# Patient Record
Sex: MALE | Race: OTHER | HISPANIC OR LATINO | Employment: OTHER | URBAN - METROPOLITAN AREA
[De-identification: names, ages, dates, MRNs, and addresses within clinical notes are randomized per-mention and may not be internally consistent; named-entity substitution may affect disease eponyms.]

---

## 2020-03-19 ENCOUNTER — HOSPITAL ENCOUNTER (INPATIENT)
Facility: HOSPITAL | Age: 65
LOS: 4 days | Discharge: HOME | DRG: 195 | End: 2020-03-23
Attending: EMERGENCY MEDICINE | Admitting: HOSPITALIST

## 2020-03-19 DIAGNOSIS — R50.9 FEVER, UNSPECIFIED FEVER CAUSE: Primary | ICD-10-CM

## 2020-03-19 DIAGNOSIS — R05.9 COUGH: ICD-10-CM

## 2020-03-19 DIAGNOSIS — Z20.822 SUSPECTED COVID-19 VIRUS INFECTION: ICD-10-CM

## 2020-03-19 DIAGNOSIS — Z03.89 RULED OUT FOR MYOCARDIAL INFARCTION: ICD-10-CM

## 2020-03-19 DIAGNOSIS — R07.9 CHEST PAIN: ICD-10-CM

## 2020-03-19 PROBLEM — E11.9 DMII (DIABETES MELLITUS, TYPE 2): Status: ACTIVE | Noted: 2020-03-19

## 2020-03-19 PROBLEM — I10 HTN (HYPERTENSION): Status: ACTIVE | Noted: 2020-03-19

## 2020-03-19 LAB
ALBUMIN SERPL BCP-MCNC: 3.7 G/DL (ref 3.5–5.2)
ALP SERPL-CCNC: 134 U/L (ref 55–135)
ALT SERPL W/O P-5'-P-CCNC: 12 U/L (ref 10–44)
ANION GAP SERPL CALC-SCNC: 11 MMOL/L (ref 8–16)
AST SERPL-CCNC: 12 U/L (ref 10–40)
BASOPHILS # BLD AUTO: 0.06 K/UL (ref 0–0.2)
BASOPHILS NFR BLD: 0.3 % (ref 0–1.9)
BILIRUB SERPL-MCNC: 0.3 MG/DL (ref 0.1–1)
BILIRUB UR QL STRIP: NEGATIVE
BNP SERPL-MCNC: 169 PG/ML (ref 0–99)
BUN SERPL-MCNC: 32 MG/DL (ref 8–23)
CALCIUM SERPL-MCNC: 9.6 MG/DL (ref 8.7–10.5)
CHLORIDE SERPL-SCNC: 105 MMOL/L (ref 95–110)
CLARITY UR: CLEAR
CO2 SERPL-SCNC: 21 MMOL/L (ref 23–29)
COLOR UR: YELLOW
CREAT SERPL-MCNC: 2.1 MG/DL (ref 0.5–1.4)
CRP SERPL-MCNC: 154.9 MG/L (ref 0–8.2)
DIFFERENTIAL METHOD: ABNORMAL
EOSINOPHIL # BLD AUTO: 0.1 K/UL (ref 0–0.5)
EOSINOPHIL NFR BLD: 0.6 % (ref 0–8)
ERYTHROCYTE [DISTWIDTH] IN BLOOD BY AUTOMATED COUNT: 13.2 % (ref 11.5–14.5)
EST. GFR  (AFRICAN AMERICAN): 37 ML/MIN/1.73 M^2
EST. GFR  (NON AFRICAN AMERICAN): 32 ML/MIN/1.73 M^2
GLUCOSE SERPL-MCNC: 207 MG/DL (ref 70–110)
GLUCOSE UR QL STRIP: NEGATIVE
HCT VFR BLD AUTO: 29.3 % (ref 40–54)
HGB BLD-MCNC: 10.5 G/DL (ref 14–18)
HGB UR QL STRIP: NEGATIVE
IMM GRANULOCYTES # BLD AUTO: 0.11 K/UL (ref 0–0.04)
IMM GRANULOCYTES NFR BLD AUTO: 0.5 % (ref 0–0.5)
INFLUENZA A, MOLECULAR: NEGATIVE
INFLUENZA B, MOLECULAR: NEGATIVE
KETONES UR QL STRIP: NEGATIVE
LACTATE SERPL-SCNC: 1.7 MMOL/L (ref 0.5–2.2)
LEUKOCYTE ESTERASE UR QL STRIP: NEGATIVE
LYMPHOCYTES # BLD AUTO: 1.1 K/UL (ref 1–4.8)
LYMPHOCYTES NFR BLD: 5 % (ref 18–48)
MCH RBC QN AUTO: 31.7 PG (ref 27–31)
MCHC RBC AUTO-ENTMCNC: 35.8 G/DL (ref 32–36)
MCV RBC AUTO: 89 FL (ref 82–98)
MONOCYTES # BLD AUTO: 1.4 K/UL (ref 0.3–1)
MONOCYTES NFR BLD: 6.4 % (ref 4–15)
NEUTROPHILS # BLD AUTO: 18.8 K/UL (ref 1.8–7.7)
NEUTROPHILS NFR BLD: 87.2 % (ref 38–73)
NITRITE UR QL STRIP: NEGATIVE
NRBC BLD-RTO: 0 /100 WBC
PH UR STRIP: 6 [PH] (ref 5–8)
PLATELET # BLD AUTO: 319 K/UL (ref 150–350)
PMV BLD AUTO: 10 FL (ref 9.2–12.9)
POCT GLUCOSE: 137 MG/DL (ref 70–110)
POCT GLUCOSE: 193 MG/DL (ref 70–110)
POTASSIUM SERPL-SCNC: 4.8 MMOL/L (ref 3.5–5.1)
PROCALCITONIN SERPL IA-MCNC: 25.7 NG/ML
PROT SERPL-MCNC: 8.6 G/DL (ref 6–8.4)
PROT UR QL STRIP: ABNORMAL
RBC # BLD AUTO: 3.31 M/UL (ref 4.6–6.2)
SODIUM SERPL-SCNC: 137 MMOL/L (ref 136–145)
SP GR UR STRIP: 1.01 (ref 1–1.03)
SPECIMEN SOURCE: NORMAL
TROPONIN I SERPL DL<=0.01 NG/ML-MCNC: 0.06 NG/ML (ref 0–0.03)
URN SPEC COLLECT METH UR: ABNORMAL
UROBILINOGEN UR STRIP-ACNC: 1 EU/DL
WBC # BLD AUTO: 21.55 K/UL (ref 3.9–12.7)

## 2020-03-19 PROCEDURE — 86140 C-REACTIVE PROTEIN: CPT

## 2020-03-19 PROCEDURE — 87798 DETECT AGENT NOS DNA AMP: CPT

## 2020-03-19 PROCEDURE — 84484 ASSAY OF TROPONIN QUANT: CPT

## 2020-03-19 PROCEDURE — 63600175 PHARM REV CODE 636 W HCPCS: Performed by: HOSPITALIST

## 2020-03-19 PROCEDURE — 63600175 PHARM REV CODE 636 W HCPCS: Performed by: NURSE PRACTITIONER

## 2020-03-19 PROCEDURE — 36415 COLL VENOUS BLD VENIPUNCTURE: CPT

## 2020-03-19 PROCEDURE — 25000003 PHARM REV CODE 250: Performed by: NURSE PRACTITIONER

## 2020-03-19 PROCEDURE — 99285 EMERGENCY DEPT VISIT HI MDM: CPT | Mod: 25

## 2020-03-19 PROCEDURE — 93010 EKG 12-LEAD: ICD-10-PCS | Mod: ,,, | Performed by: INTERNAL MEDICINE

## 2020-03-19 PROCEDURE — 84145 PROCALCITONIN (PCT): CPT

## 2020-03-19 PROCEDURE — 96360 HYDRATION IV INFUSION INIT: CPT

## 2020-03-19 PROCEDURE — 93005 ELECTROCARDIOGRAM TRACING: CPT

## 2020-03-19 PROCEDURE — 85025 COMPLETE CBC W/AUTO DIFF WBC: CPT

## 2020-03-19 PROCEDURE — 87502 INFLUENZA DNA AMP PROBE: CPT

## 2020-03-19 PROCEDURE — 96361 HYDRATE IV INFUSION ADD-ON: CPT

## 2020-03-19 PROCEDURE — 11000001 HC ACUTE MED/SURG PRIVATE ROOM

## 2020-03-19 PROCEDURE — 83605 ASSAY OF LACTIC ACID: CPT

## 2020-03-19 PROCEDURE — 80053 COMPREHEN METABOLIC PANEL: CPT

## 2020-03-19 PROCEDURE — 81003 URINALYSIS AUTO W/O SCOPE: CPT

## 2020-03-19 PROCEDURE — 93010 ELECTROCARDIOGRAM REPORT: CPT | Mod: ,,, | Performed by: INTERNAL MEDICINE

## 2020-03-19 PROCEDURE — 83880 ASSAY OF NATRIURETIC PEPTIDE: CPT

## 2020-03-19 RX ORDER — ACETAMINOPHEN 325 MG/1
650 TABLET ORAL EVERY 4 HOURS PRN
Status: DISCONTINUED | OUTPATIENT
Start: 2020-03-19 | End: 2020-03-23 | Stop reason: HOSPADM

## 2020-03-19 RX ORDER — INSULIN ASPART 100 [IU]/ML
0-5 INJECTION, SOLUTION INTRAVENOUS; SUBCUTANEOUS
Status: DISCONTINUED | OUTPATIENT
Start: 2020-03-19 | End: 2020-03-23 | Stop reason: HOSPADM

## 2020-03-19 RX ORDER — TALC
6 POWDER (GRAM) TOPICAL NIGHTLY PRN
Status: DISCONTINUED | OUTPATIENT
Start: 2020-03-19 | End: 2020-03-23 | Stop reason: HOSPADM

## 2020-03-19 RX ORDER — ACETAMINOPHEN 500 MG
1000 TABLET ORAL
Status: COMPLETED | OUTPATIENT
Start: 2020-03-19 | End: 2020-03-19

## 2020-03-19 RX ORDER — ENOXAPARIN SODIUM 100 MG/ML
30 INJECTION SUBCUTANEOUS EVERY 24 HOURS
Status: DISCONTINUED | OUTPATIENT
Start: 2020-03-19 | End: 2020-03-20

## 2020-03-19 RX ORDER — POLYETHYLENE GLYCOL 3350 17 G/17G
17 POWDER, FOR SOLUTION ORAL DAILY
Status: DISCONTINUED | OUTPATIENT
Start: 2020-03-20 | End: 2020-03-23 | Stop reason: HOSPADM

## 2020-03-19 RX ORDER — AMLODIPINE BESYLATE 5 MG/1
5 TABLET ORAL DAILY
Status: DISCONTINUED | OUTPATIENT
Start: 2020-03-20 | End: 2020-03-23 | Stop reason: HOSPADM

## 2020-03-19 RX ORDER — ACETAMINOPHEN 325 MG/1
650 TABLET ORAL EVERY 8 HOURS PRN
Status: DISCONTINUED | OUTPATIENT
Start: 2020-03-19 | End: 2020-03-23 | Stop reason: HOSPADM

## 2020-03-19 RX ORDER — IBUPROFEN 200 MG
24 TABLET ORAL
Status: DISCONTINUED | OUTPATIENT
Start: 2020-03-19 | End: 2020-03-23 | Stop reason: HOSPADM

## 2020-03-19 RX ORDER — GLUCAGON 1 MG
1 KIT INJECTION
Status: DISCONTINUED | OUTPATIENT
Start: 2020-03-19 | End: 2020-03-23 | Stop reason: HOSPADM

## 2020-03-19 RX ORDER — IBUPROFEN 200 MG
16 TABLET ORAL
Status: DISCONTINUED | OUTPATIENT
Start: 2020-03-19 | End: 2020-03-23 | Stop reason: HOSPADM

## 2020-03-19 RX ORDER — SODIUM CHLORIDE 0.9 % (FLUSH) 0.9 %
10 SYRINGE (ML) INJECTION
Status: DISCONTINUED | OUTPATIENT
Start: 2020-03-19 | End: 2020-03-23 | Stop reason: HOSPADM

## 2020-03-19 RX ADMIN — ACETAMINOPHEN 1000 MG: 500 TABLET ORAL at 10:03

## 2020-03-19 RX ADMIN — CEFTRIAXONE 1 G: 1 INJECTION, SOLUTION INTRAVENOUS at 10:03

## 2020-03-19 RX ADMIN — SODIUM CHLORIDE 1000 ML: 0.9 INJECTION, SOLUTION INTRAVENOUS at 12:03

## 2020-03-19 RX ADMIN — ENOXAPARIN SODIUM 30 MG: 100 INJECTION SUBCUTANEOUS at 10:03

## 2020-03-19 NOTE — SUBJECTIVE & OBJECTIVE
Past Medical History:   Diagnosis Date    Diabetes mellitus     Hypertension        History reviewed. No pertinent surgical history.    Review of patient's allergies indicates:  No Known Allergies    No current facility-administered medications on file prior to encounter.      Current Outpatient Medications on File Prior to Encounter   Medication Sig    amlodipine (NORVASC) 5 MG tablet Take 5 mg by mouth once daily.    insulin glargine (LANTUS) 100 unit/mL injection Inject into the skin every evening.     Family History     None        Tobacco Use    Smoking status: Never Smoker    Smokeless tobacco: Never Used   Substance and Sexual Activity    Alcohol use: No    Drug use: No    Sexual activity: Not on file     Review of Systems   Constitutional: Positive for activity change and fever. Negative for chills.   HENT: Positive for congestion.    Respiratory: Positive for cough. Negative for shortness of breath.    Cardiovascular: Negative for chest pain.   Gastrointestinal: Negative for constipation, diarrhea, nausea and vomiting.   Neurological: Negative for dizziness.   Psychiatric/Behavioral: Negative for decreased concentration.     Objective:     Vital Signs (Most Recent):  Temp: 98.7 °F (37.1 °C) (03/19/20 1416)  Pulse: 100 (03/19/20 1802)  Resp: 20 (03/19/20 1416)  BP: (!) 142/72 (03/19/20 1802)  SpO2: 97 % (03/19/20 1802) Vital Signs (24h Range):  Temp:  [98.7 °F (37.1 °C)-102.4 °F (39.1 °C)] 98.7 °F (37.1 °C)  Pulse:  [] 100  Resp:  [19-24] 20  SpO2:  [93 %-99 %] 97 %  BP: (122-157)/(66-85) 142/72     Weight: 86.2 kg (190 lb)  Body mass index is 28.89 kg/m².    Physical Exam   Constitutional: He is oriented to person, place, and time. He appears well-developed and well-nourished.   HENT:   Head: Atraumatic.   Eyes: EOM are normal.   Neck: Normal range of motion.   Cardiovascular: Normal rate.   Pulmonary/Chest: No respiratory distress.   Musculoskeletal: Normal range of motion.   Neurological:  He is alert and oriented to person, place, and time.   Psychiatric: He has a normal mood and affect. His behavior is normal. Judgment and thought content normal.   Nursing note and vitals reviewed.        CRANIAL NERVES     CN III, IV, VI   Extraocular motions are normal.        Significant Labs:   Recent Labs   Lab 03/19/20  1236   WBC 21.55*   HGB 10.5*   HCT 29.3*        Recent Labs   Lab 03/19/20  1236      K 4.8      CO2 21*   BUN 32*   CREATININE 2.1*   *   CALCIUM 9.6     Recent Labs   Lab 03/19/20  1236   ALKPHOS 134   ALT 12   AST 12   ALBUMIN 3.7   PROT 8.6*   BILITOT 0.3      No results for input(s): CPK, CPKMB, MB, TROPONINI in the last 72 hours.  Recent Labs   Lab 03/19/20  1047   POCTGLUCOSE 193*     No results found for: HGBA1C  Scheduled Meds:  Continuous Infusions:  As Needed:     Significant Imaging:   X-Ray Chest 1 View  Narrative: EXAMINATION:  XR CHEST 1 VIEW    CLINICAL HISTORY:  Cough    TECHNIQUE:  Single frontal view of the chest was performed.    COMPARISON:  None    FINDINGS:  The cardiac silhouette is normal in size and midline.  No focal airspace disease.  No pleural effusion.  No pneumothorax.  The osseous structures appear normal.  Impression: No definite acute intrathoracic process seen    Electronically signed by: Brittany Clifford MD  Date:    03/19/2020  Time:    11:19

## 2020-03-19 NOTE — ED NOTES
Pt c/o pain to the entire left side of his back since 0600 this morning. Also c/o mild cough for awhile. Skin is hot, dry. Pt is febrile. Denies taking anything for his pain or fever today. Pt is also tachycardic and tachypneic. Will continue to monitor.     APPEARANCE: Alert, oriented and in no acute distress.  HEENT: Speaks without hoarseness.  CARDIAC: Normal rate and rhythm. Tachycardic  PERIPHERAL VASCULAR: peripheral pulses present. Normal cap refill. No edema. Warm to touch.    RESPIRATORY:Normal rate and effort, breath sounds clear bilaterally throughout chest. Respirations are equal and unlabored no obvious signs of distress.  GASTRO: soft, nondistended, nontender. Denies nausea, vomiting, or diarrhea.  : voids spontaneously and without difficulty.   MUSC: Full ROM. No obvious deformity. Ambulatory with a steady gait. C/o pain to the entire left side of his back.  SKIN: Skin is hot and dry, without discoloration. Mucous membranes moist.  NEURO: Pt is awake, alert, aware of environment. No neurologic deficits noted.

## 2020-03-19 NOTE — ED NOTES
Pt's daughter called for update. Pt states ok to update his daughter, Manisha, on his status and plan.

## 2020-03-19 NOTE — H&P
"[facility] Hospital Medicine  History & Physical    Patient Name: Antoine Ghosh  MRN: [id number]  Admission Date: 3/19/2020  Attending Physician: Pedrito Galvan DO  Primary Care Provider: Primary Doctor No         Patient information was obtained from patient and ER records.     Subjective:     Principal Problem:Suspected Covid-19 Virus Infection    Chief Complaint:   Chief Complaint   Patient presents with    Back Pain     c/o pain to the entire left side of his back since 0600. Also c/o mild cough for awhile.         HPI: The patient is a 64 years old male patient with previous medical history significant for  DMII and HTN, the pt presented to the ED for left side back pain since this morning.    The patient denies trauma.  Pt also reports cough "for a while."  No CP or SOB.  No n/v/d, recent travel, or known sick contacts.  He has taken no medications for his symptoms.  No other complaints at this time.  The pt evaluated on admission, he is comfortable some few coughs that are dry.  Denies having anyone sick at home    Past Medical History:   Diagnosis Date    Diabetes mellitus     Hypertension        History reviewed. No pertinent surgical history.    Review of patient's allergies indicates:  No Known Allergies    No current facility-administered medications on file prior to encounter.      Current Outpatient Medications on File Prior to Encounter   Medication Sig    amlodipine (NORVASC) 5 MG tablet Take 5 mg by mouth once daily.    insulin glargine (LANTUS) 100 unit/mL injection Inject into the skin every evening.     Family History     None        Tobacco Use    Smoking status: Never Smoker    Smokeless tobacco: Never Used   Substance and Sexual Activity    Alcohol use: No    Drug use: No    Sexual activity: Not on file     Review of Systems   Constitutional: Positive for activity change and fever. Negative for chills.   HENT: Positive for congestion.    Respiratory: Positive " for cough. Negative for shortness of breath.    Cardiovascular: Negative for chest pain.   Gastrointestinal: Negative for constipation, diarrhea, nausea and vomiting.   Neurological: Negative for dizziness.   Psychiatric/Behavioral: Negative for decreased concentration.     Objective:     Vital Signs (Most Recent):  Temp: 98.7 °F (37.1 °C) (03/19/20 1416)  Pulse: 100 (03/19/20 1802)  Resp: 20 (03/19/20 1416)  BP: (!) 142/72 (03/19/20 1802)  SpO2: 97 % (03/19/20 1802) Vital Signs (24h Range):  Temp:  [98.7 °F (37.1 °C)-102.4 °F (39.1 °C)] 98.7 °F (37.1 °C)  Pulse:  [] 100  Resp:  [19-24] 20  SpO2:  [93 %-99 %] 97 %  BP: (122-157)/(66-85) 142/72     Weight: 86.2 kg (190 lb)  Body mass index is 28.89 kg/m².    Physical Exam   Constitutional: He is oriented to person, place, and time. He appears well-developed and well-nourished.   HENT:   Head: Atraumatic.   Eyes: EOM are normal.   Neck: Normal range of motion.   Cardiovascular: Normal rate.   Pulmonary/Chest: No respiratory distress.   Musculoskeletal: Normal range of motion.   Neurological: He is alert and oriented to person, place, and time.   Psychiatric: He has a normal mood and affect. His behavior is normal. Judgment and thought content normal.   Nursing note and vitals reviewed.        CRANIAL NERVES     CN III, IV, VI   Extraocular motions are normal.        Significant Labs:   Recent Labs   Lab 03/19/20  1236   WBC 21.55*   HGB 10.5*   HCT 29.3*        Recent Labs   Lab 03/19/20  1236      K 4.8      CO2 21*   BUN 32*   CREATININE 2.1*   *   CALCIUM 9.6     Recent Labs   Lab 03/19/20  1236   ALKPHOS 134   ALT 12   AST 12   ALBUMIN 3.7   PROT 8.6*   BILITOT 0.3      No results for input(s): CPK, CPKMB, MB, TROPONINI in the last 72 hours.  Recent Labs   Lab 03/19/20  1047   POCTGLUCOSE 193*     No results found for: HGBA1C  Scheduled Meds:  Continuous Infusions:  As Needed:     Significant Imaging:   X-Ray Chest 1  View  Narrative: EXAMINATION:  XR CHEST 1 VIEW    CLINICAL HISTORY:  Cough    TECHNIQUE:  Single frontal view of the chest was performed.    COMPARISON:  None    FINDINGS:  The cardiac silhouette is normal in size and midline.  No focal airspace disease.  No pleural effusion.  No pneumothorax.  The osseous structures appear normal.  Impression: No definite acute intrathoracic process seen    Electronically signed by: Brittany Clifford MD  Date:    03/19/2020  Time:    11:19        Assessment/Plan:     * Suspected Covid-19 Virus Infection  Cough  Fever  Debility  Elevated WBC  Admit to tele  Utilize the covid-19 orders panel.  Await PCR results      HTN (hypertension)    Norvasc  Cardiac diet    DMII (diabetes mellitus, type 2)    ISS  DM diet    Fever  Tylenol  Avoid NSAID's.  monitor      Cough  Could be related to viral syndrome  F/u pending labs        VTE Risk Mitigation (From admission, onward)    None             Pedrito Galvan DO  Department of Hospital Medicine   [facility]

## 2020-03-19 NOTE — ED NOTES
"Pt presents with left side back pain and fever that started today. Reports son has also been sick. Denies any urinary symptoms or complaints. Reports cough "for a long time". No other complaints reported. Febrile upon arrival .   "

## 2020-03-19 NOTE — HPI
"The patient is a 64 years old male patient with previous medical history significant for  DMII and HTN, the pt presented to the ED for left side back pain since this morning.    The patient denies trauma.  Pt also reports cough "for a while."  No CP or SOB.  No n/v/d, recent travel, or known sick contacts.  He has taken no medications for his symptoms.  No other complaints at this time.  The pt evaluated on admission, he is comfortable some few coughs that are dry.  Denies having anyone sick at home  "

## 2020-03-19 NOTE — ED PROVIDER NOTES
"Encounter Date: 3/19/2020       History     Chief Complaint   Patient presents with    Back Pain     c/o pain to the entire left side of his back since 0600. Also c/o mild cough for awhile.      65yo male, pmhx of DM and HTN, presents to the ED for left side back pain since around 0600.  The patient denies trauma.  Pt also reports cough "for a while."  No CP or SOB.  No n/v/d, recent travel, or known sick contacts.  He has taken no medications for his symptoms.  No other complaints at this time.      The history is provided by the patient. The history is limited by a language barrier. A  was used.     Review of patient's allergies indicates:  No Known Allergies  Past Medical History:   Diagnosis Date    Diabetes mellitus     Hypertension      History reviewed. No pertinent surgical history.  History reviewed. No pertinent family history.  Social History     Tobacco Use    Smoking status: Never Smoker    Smokeless tobacco: Never Used   Substance Use Topics    Alcohol use: No    Drug use: No     Review of Systems   Constitutional: Positive for activity change, chills and fatigue. Negative for fever.   HENT: Negative for facial swelling, nosebleeds and trouble swallowing.    Respiratory: Positive for cough. Negative for chest tightness and shortness of breath.    Cardiovascular: Negative for chest pain.   Gastrointestinal: Negative for abdominal pain, diarrhea, nausea and vomiting.   Musculoskeletal: Positive for back pain. Negative for joint swelling, myalgias and neck pain.   Skin: Negative.    Neurological: Negative for weakness and headaches.   Psychiatric/Behavioral: Negative for confusion.   All other systems reviewed and are negative.      Physical Exam     Initial Vitals [03/19/20 1037]   BP Pulse Resp Temp SpO2   133/77 (!) 126 (!) 24 (!) 102.4 °F (39.1 °C) 96 %      MAP       --         Physical Exam    Nursing note and vitals reviewed.  Constitutional: He appears well-developed and " well-nourished. He is active and cooperative. He is easily aroused. He does not have a sickly appearance. He appears ill. No distress.   HENT:   Head: Normocephalic and atraumatic.   Mouth/Throat: Mucous membranes are normal.   Eyes: Conjunctivae are normal.   Neck: Normal range of motion and phonation normal.   Cardiovascular: Regular rhythm and normal heart sounds. Tachycardia present.    Pulmonary/Chest: Effort normal and breath sounds normal. No accessory muscle usage. Tachypnea noted. No respiratory distress. He has no wheezes.   Abdominal: Soft. Normal appearance and bowel sounds are normal. He exhibits no distension. There is no tenderness. There is no rigidity, no rebound, no guarding and no CVA tenderness.   Neurological: He is alert, oriented to person, place, and time and easily aroused. He has normal strength. Coordination normal. GCS eye subscore is 4. GCS verbal subscore is 5. GCS motor subscore is 6.   Skin: Skin is warm, dry and intact. Capillary refill takes less than 2 seconds. No bruising and no rash noted. No pallor.   Psychiatric: He has a normal mood and affect. His speech is normal and behavior is normal. Judgment and thought content normal. Cognition and memory are normal.         ED Course   Procedures  Labs Reviewed   POCT GLUCOSE - Abnormal; Notable for the following components:       Result Value    POCT Glucose 193 (*)     All other components within normal limits   INFLUENZA A & B BY MOLECULAR   POCT GLUCOSE MONITORING CONTINUOUS          Imaging Results          X-Ray Chest 1 View (Final result)  Result time 03/19/20 11:19:52    Final result by Brittany Clifford MD (03/19/20 11:19:52)                 Impression:      No definite acute intrathoracic process seen      Electronically signed by: Brittany Clifford MD  Date:    03/19/2020  Time:    11:19             Narrative:    EXAMINATION:  XR CHEST 1 VIEW    CLINICAL HISTORY:  Cough    TECHNIQUE:  Single frontal view of the chest  was performed.    COMPARISON:  None    FINDINGS:  The cardiac silhouette is normal in size and midline.  No focal airspace disease.  No pleural effusion.  No pneumothorax.  The osseous structures appear normal.                                 Medical Decision Making:   Differential Diagnosis:   URI, influenza, viral, pneumonia, strain, sprain, spasm  Clinical Tests:   Lab Tests: Ordered and Reviewed  Radiological Study: Reviewed and Ordered  ED Management:  Labs, CXR  11:56 AM  Pt remains tachycardic.  He will be transferred to another provider for additional evaluation.                                  Clinical Impression:       ICD-10-CM ICD-9-CM   1. Cough R05 786.2                                Latrice Macias, [facility]  03/19/20 1158       Latrice Macias, [facility]  03/19/20 1344

## 2020-03-19 NOTE — ED NOTES
Pt updated on admission status, aware that daughter has been updated on POC. Pt denies any requests/complaints at this time. Appears in NAD. Vitals stable. Will continue to monitor.

## 2020-03-19 NOTE — ASSESSMENT & PLAN NOTE
Cough  Fever  Debility  Elevated WBC  Admit to tele  Utilize the covid-19 orders panel.  Await PCR results

## 2020-03-20 LAB
ADENOVIRUS: NOT DETECTED
ANION GAP SERPL CALC-SCNC: 9 MMOL/L (ref 8–16)
BASOPHILS # BLD AUTO: 0.05 K/UL (ref 0–0.2)
BASOPHILS NFR BLD: 0.3 % (ref 0–1.9)
BORDETELLA PARAPERTUSSIS (IS1001): NOT DETECTED
BORDETELLA PERTUSSIS (PTXP): NOT DETECTED
BUN SERPL-MCNC: 28 MG/DL (ref 8–23)
CALCIUM SERPL-MCNC: 9.2 MG/DL (ref 8.7–10.5)
CHLAMYDIA PNEUMONIAE: NOT DETECTED
CHLORIDE SERPL-SCNC: 107 MMOL/L (ref 95–110)
CO2 SERPL-SCNC: 23 MMOL/L (ref 23–29)
CORONAVIRUS 229E, COMMON COLD VIRUS: NOT DETECTED
CORONAVIRUS HKU1, COMMON COLD VIRUS: NOT DETECTED
CORONAVIRUS NL63, COMMON COLD VIRUS: NOT DETECTED
CORONAVIRUS OC43, COMMON COLD VIRUS: NOT DETECTED
CREAT SERPL-MCNC: 1.6 MG/DL (ref 0.5–1.4)
DIFFERENTIAL METHOD: ABNORMAL
EOSINOPHIL # BLD AUTO: 0.2 K/UL (ref 0–0.5)
EOSINOPHIL NFR BLD: 0.9 % (ref 0–8)
ERYTHROCYTE [DISTWIDTH] IN BLOOD BY AUTOMATED COUNT: 13.2 % (ref 11.5–14.5)
EST. GFR  (AFRICAN AMERICAN): 52 ML/MIN/1.73 M^2
EST. GFR  (NON AFRICAN AMERICAN): 45 ML/MIN/1.73 M^2
FLUBV RNA NPH QL NAA+NON-PROBE: NOT DETECTED
GLUCOSE SERPL-MCNC: 136 MG/DL (ref 70–110)
HCT VFR BLD AUTO: 28 % (ref 40–54)
HGB BLD-MCNC: 9.6 G/DL (ref 14–18)
HPIV1 RNA NPH QL NAA+NON-PROBE: NOT DETECTED
HPIV2 RNA NPH QL NAA+NON-PROBE: NOT DETECTED
HPIV3 RNA NPH QL NAA+NON-PROBE: NOT DETECTED
HPIV4 RNA NPH QL NAA+NON-PROBE: NOT DETECTED
HUMAN METAPNEUMOVIRUS: NOT DETECTED
IMM GRANULOCYTES # BLD AUTO: 0.09 K/UL (ref 0–0.04)
IMM GRANULOCYTES NFR BLD AUTO: 0.5 % (ref 0–0.5)
INFLUENZA A (SUBTYPES H1,H1-2009,H3): NOT DETECTED
LYMPHOCYTES # BLD AUTO: 2.1 K/UL (ref 1–4.8)
LYMPHOCYTES NFR BLD: 11.1 % (ref 18–48)
MCH RBC QN AUTO: 30.7 PG (ref 27–31)
MCHC RBC AUTO-ENTMCNC: 34.3 G/DL (ref 32–36)
MCV RBC AUTO: 90 FL (ref 82–98)
MONOCYTES # BLD AUTO: 1.3 K/UL (ref 0.3–1)
MONOCYTES NFR BLD: 6.7 % (ref 4–15)
MYCOPLASMA PNEUMONIAE: NOT DETECTED
NEUTROPHILS # BLD AUTO: 15.4 K/UL (ref 1.8–7.7)
NEUTROPHILS NFR BLD: 80.5 % (ref 38–73)
NRBC BLD-RTO: 0 /100 WBC
PLATELET # BLD AUTO: 302 K/UL (ref 150–350)
PMV BLD AUTO: 10.4 FL (ref 9.2–12.9)
POCT GLUCOSE: 138 MG/DL (ref 70–110)
POCT GLUCOSE: 165 MG/DL (ref 70–110)
POCT GLUCOSE: 180 MG/DL (ref 70–110)
POCT GLUCOSE: 222 MG/DL (ref 70–110)
POTASSIUM SERPL-SCNC: 4.3 MMOL/L (ref 3.5–5.1)
RBC # BLD AUTO: 3.13 M/UL (ref 4.6–6.2)
RESPIRATORY INFECTION PANEL SOURCE: NORMAL
RSV RNA NPH QL NAA+NON-PROBE: NOT DETECTED
RV+EV RNA NPH QL NAA+NON-PROBE: NOT DETECTED
SODIUM SERPL-SCNC: 139 MMOL/L (ref 136–145)
WBC # BLD AUTO: 19.13 K/UL (ref 3.9–12.7)

## 2020-03-20 PROCEDURE — 36415 COLL VENOUS BLD VENIPUNCTURE: CPT

## 2020-03-20 PROCEDURE — 11000001 HC ACUTE MED/SURG PRIVATE ROOM

## 2020-03-20 PROCEDURE — 80048 BASIC METABOLIC PNL TOTAL CA: CPT

## 2020-03-20 PROCEDURE — 63600175 PHARM REV CODE 636 W HCPCS: Performed by: HOSPITALIST

## 2020-03-20 PROCEDURE — 85025 COMPLETE CBC W/AUTO DIFF WBC: CPT

## 2020-03-20 PROCEDURE — 25000003 PHARM REV CODE 250: Performed by: HOSPITALIST

## 2020-03-20 RX ORDER — ENOXAPARIN SODIUM 100 MG/ML
40 INJECTION SUBCUTANEOUS EVERY 24 HOURS
Status: DISCONTINUED | OUTPATIENT
Start: 2020-03-20 | End: 2020-03-23 | Stop reason: HOSPADM

## 2020-03-20 RX ADMIN — Medication 6 MG: at 09:03

## 2020-03-20 RX ADMIN — ACETAMINOPHEN 650 MG: 325 TABLET ORAL at 06:03

## 2020-03-20 RX ADMIN — ACETAMINOPHEN 650 MG: 325 TABLET ORAL at 11:03

## 2020-03-20 RX ADMIN — POLYETHYLENE GLYCOL 3350 17 G: 17 POWDER, FOR SOLUTION ORAL at 08:03

## 2020-03-20 RX ADMIN — AMLODIPINE BESYLATE 5 MG: 5 TABLET ORAL at 08:03

## 2020-03-20 RX ADMIN — ENOXAPARIN SODIUM 40 MG: 100 INJECTION SUBCUTANEOUS at 04:03

## 2020-03-20 RX ADMIN — Medication 6 MG: at 01:03

## 2020-03-20 RX ADMIN — CEFTRIAXONE 1 G: 1 INJECTION, SOLUTION INTRAVENOUS at 09:03

## 2020-03-20 NOTE — PLAN OF CARE
VN cued into room for q2h rounding.  Pt resting comfortably in bed.  No needs or complaints at this time.  VN will continue to follow and be available as needed.

## 2020-03-20 NOTE — PLAN OF CARE
Problem: Adult Inpatient Plan of Care  Goal: Plan of Care Review  Outcome: Ongoing, Progressing     Labs, notes, and orders reviewed.

## 2020-03-20 NOTE — SUBJECTIVE & OBJECTIVE
Interval History: pending COVID testing, improving dfrom admission, not SOB, less cough  Evaluated visrtually  Review of Systems   Constitutional: Positive for fatigue. Negative for activity change and fever.   Respiratory: Negative for cough.    Cardiovascular: Negative for chest pain and leg swelling.   Gastrointestinal: Negative for diarrhea, nausea and vomiting.   Neurological: Negative for dizziness and light-headedness.     Objective:     Vital Signs (Most Recent):  Temp: 99.1 °F (37.3 °C) (03/20/20 1711)  Pulse: 99 (03/20/20 1711)  Resp: 18 (03/20/20 1711)  BP: (!) 158/83 (03/20/20 1711)  SpO2: 97 % (03/19/20 1802) Vital Signs (24h Range):  Temp:  [98.5 °F (36.9 °C)-100.6 °F (38.1 °C)] 99.1 °F (37.3 °C)  Pulse:  [] 99  Resp:  [18-20] 18  BP: (137-158)/(74-83) 158/83     Weight: 83 kg (182 lb 15.7 oz)  Body mass index is 27.82 kg/m².    Intake/Output Summary (Last 24 hours) at 3/20/2020 1844  Last data filed at 3/20/2020 1232  Gross per 24 hour   Intake 620 ml   Output 1050 ml   Net -430 ml      Physical Exam   Constitutional: He is oriented to person, place, and time. He appears well-developed and well-nourished.   HENT:   Head: Atraumatic.   Eyes: EOM are normal.   Neck: Normal range of motion.   Pulmonary/Chest: No respiratory distress.   Musculoskeletal: Normal range of motion.   Neurological: He is alert and oriented to person, place, and time.   Psychiatric: He has a normal mood and affect. His behavior is normal. Judgment and thought content normal.   Nursing note and vitals reviewed.      Significant Labs:   Recent Labs   Lab 03/19/20  1236 03/20/20  0532   WBC 21.55* 19.13*   HGB 10.5* 9.6*   HCT 29.3* 28.0*    302     Recent Labs   Lab 03/19/20  1236 03/20/20  0532    139   K 4.8 4.3    107   CO2 21* 23   BUN 32* 28*   CREATININE 2.1* 1.6*   * 136*   CALCIUM 9.6 9.2     Recent Labs   Lab 03/19/20  1236   ALKPHOS 134   ALT 12   AST 12   ALBUMIN 3.7   PROT 8.6*   BILITOT  0.3      Recent Labs     03/19/20  2204   TROPONINI 0.064*     Recent Labs   Lab 03/19/20  1047 03/19/20  2329 03/20/20  0612 03/20/20  1230 03/20/20  1709   POCTGLUCOSE 193* 137* 138* 165* 222*     No results found for: HGBA1C  Scheduled Meds:   amLODIPine  5 mg Oral Daily    cefTRIAXone (ROCEPHIN) IVPB  1 g Intravenous Q24H    enoxaparin  40 mg Subcutaneous Daily    polyethylene glycol  17 g Oral Daily     Continuous Infusions:  As Needed: acetaminophen, acetaminophen, Dextrose 10% Bolus, Dextrose 10% Bolus, glucagon (human recombinant), glucose, glucose, insulin aspart U-100, melatonin, sodium chloride 0.9%    Significant Imaging:   X-Ray Chest 1 View  Narrative: EXAMINATION:  XR CHEST 1 VIEW    CLINICAL HISTORY:  Cough    TECHNIQUE:  Single frontal view of the chest was performed.    COMPARISON:  None    FINDINGS:  The cardiac silhouette is normal in size and midline.  No focal airspace disease.  No pleural effusion.  No pneumothorax.  The osseous structures appear normal.  Impression: No definite acute intrathoracic process seen    Electronically signed by: Brittany Clifford MD  Date:    03/19/2020  Time:    11:19

## 2020-03-20 NOTE — HOSPITAL COURSE
3/20/2020 doing better, less SOB, improving cough, COVID test is pending  3/21/2020 The patient was evaluated via virtual rounding, discussion with nurses and reviewing VS, notes was part of this evaluation.  Pt feeling beeter, less cough less SOB, some chest pain on coughing/ non cardiac  3/22/2020 pt seen, has no headache no cough, no shortness of breath, asking when to go home, might be tomorrow. Appetite is good  3/23/2020 WBCs 19K on 3/20/2020 and down furtehr to 15K this AM. COVID 19 negative. On IV Rocephin daily with continued cough although improving. Hopeful to discharge today on oral abx

## 2020-03-20 NOTE — PROGRESS NOTES
Pharmacist Renal Dose Adjustment Note    Antoine Ghosh is a 64 y.o. male being treated with the medication enoxaparin    Patient Data:    Vital Signs (Most Recent):  Temp: 98.9 °F (37.2 °C) (03/20/20 0845)  Pulse: 100 (03/20/20 0845)  Resp: 19 (03/20/20 0845)  BP: (!) 140/76 (03/20/20 0845)  SpO2: 97 % (03/19/20 1802)   Vital Signs (72h Range):  Temp:  [98.5 °F (36.9 °C)-102.4 °F (39.1 °C)]   Pulse:  []   Resp:  [19-24]   BP: (122-157)/(66-85)   SpO2:  [93 %-99 %]      Recent Labs   Lab 03/19/20  1236 03/20/20  0532   CREATININE 2.1* 1.6*     Serum creatinine: 1.6 mg/dL (H) 03/20/20 0532  Estimated creatinine clearance: 49 mL/min (A)    Medication:enoxaparin 30mg daily will be changed to medication:enoxaparin 40mg daily    Pharmacist's Name: Zakia King  Pharmacist's Extension: [phone]

## 2020-03-20 NOTE — PLAN OF CARE
Virtual nurse cued into patient room with permission. Pt states he is Cambodian speaking. Requested that nurse place KAREEN into room so that admission questions can be completed.

## 2020-03-20 NOTE — PLAN OF CARE
VN cued into room for q2h rounding.  Pt resting comfortably in bed.  No needs or complaints at this time.  VN will continue to follow and be available as needed.     shooting/stabbing/intermittent

## 2020-03-20 NOTE — PROGRESS NOTES
"[facility] Hospital Medicine  Progress Note    Patient Name: Antoine Ghosh  MRN: [id number]  Patient Class: IP- Inpatient   Admission Date: 3/19/2020  Length of Stay: 1 days  Attending Physician: Pedrito Galvan DO  Primary Care Provider: Thomas        Subjective:     Principal Problem:Suspected Covid-19 Virus Infection        HPI:  The patient is a 64 years old male patient with previous medical history significant for  DMII and HTN, the pt presented to the ED for left side back pain since this morning.    The patient denies trauma.  Pt also reports cough "for a while."  No CP or SOB.  No n/v/d, recent travel, or known sick contacts.  He has taken no medications for his symptoms.  No other complaints at this time.  The pt evaluated on admission, he is comfortable some few coughs that are dry.  Denies having anyone sick at home    Overview/Hospital Course:  3/20 doing better, less SOB, improving cough, COVID test is pending    Interval History: pending COVID testing, improving dfrom admission, not SOB, less cough  Evaluated visrtually  Review of Systems   Constitutional: Positive for fatigue. Negative for activity change and fever.   Respiratory: Negative for cough.    Cardiovascular: Negative for chest pain and leg swelling.   Gastrointestinal: Negative for diarrhea, nausea and vomiting.   Neurological: Negative for dizziness and light-headedness.     Objective:     Vital Signs (Most Recent):  Temp: 99.1 °F (37.3 °C) (03/20/20 1711)  Pulse: 99 (03/20/20 1711)  Resp: 18 (03/20/20 1711)  BP: (!) 158/83 (03/20/20 1711)  SpO2: 97 % (03/19/20 1802) Vital Signs (24h Range):  Temp:  [98.5 °F (36.9 °C)-100.6 °F (38.1 °C)] 99.1 °F (37.3 °C)  Pulse:  [] 99  Resp:  [18-20] 18  BP: (137-158)/(74-83) 158/83     Weight: 83 kg (182 lb 15.7 oz)  Body mass index is 27.82 kg/m².    Intake/Output Summary (Last 24 hours) at 3/20/2020 1358  Last data filed at 3/20/2020 1232  Gross " per 24 hour   Intake 620 ml   Output 1050 ml   Net -430 ml      Physical Exam   Constitutional: He is oriented to person, place, and time. He appears well-developed and well-nourished.   HENT:   Head: Atraumatic.   Eyes: EOM are normal.   Neck: Normal range of motion.   Pulmonary/Chest: No respiratory distress.   Musculoskeletal: Normal range of motion.   Neurological: He is alert and oriented to person, place, and time.   Psychiatric: He has a normal mood and affect. His behavior is normal. Judgment and thought content normal.   Nursing note and vitals reviewed.      Significant Labs:   Recent Labs   Lab 03/19/20  1236 03/20/20  0532   WBC 21.55* 19.13*   HGB 10.5* 9.6*   HCT 29.3* 28.0*    302     Recent Labs   Lab 03/19/20  1236 03/20/20  0532    139   K 4.8 4.3    107   CO2 21* 23   BUN 32* 28*   CREATININE 2.1* 1.6*   * 136*   CALCIUM 9.6 9.2     Recent Labs   Lab 03/19/20  1236   ALKPHOS 134   ALT 12   AST 12   ALBUMIN 3.7   PROT 8.6*   BILITOT 0.3      Recent Labs     03/19/20  2204   TROPONINI 0.064*     Recent Labs   Lab 03/19/20  1047 03/19/20  2329 03/20/20  0612 03/20/20  1230 03/20/20  1709   POCTGLUCOSE 193* 137* 138* 165* 222*     No results found for: HGBA1C  Scheduled Meds:   amLODIPine  5 mg Oral Daily    cefTRIAXone (ROCEPHIN) IVPB  1 g Intravenous Q24H    enoxaparin  40 mg Subcutaneous Daily    polyethylene glycol  17 g Oral Daily     Continuous Infusions:  As Needed: acetaminophen, acetaminophen, Dextrose 10% Bolus, Dextrose 10% Bolus, glucagon (human recombinant), glucose, glucose, insulin aspart U-100, melatonin, sodium chloride 0.9%    Significant Imaging:   X-Ray Chest 1 View  Narrative: EXAMINATION:  XR CHEST 1 VIEW    CLINICAL HISTORY:  Cough    TECHNIQUE:  Single frontal view of the chest was performed.    COMPARISON:  None    FINDINGS:  The cardiac silhouette is normal in size and midline.  No focal airspace disease.  No pleural effusion.  No pneumothorax.   The osseous structures appear normal.  Impression: No definite acute intrathoracic process seen    Electronically signed by: Brittany Clifford MD  Date:    03/19/2020  Time:    11:19          Assessment/Plan:      * Suspected Covid-19 Virus Infection  Cough  Fever  Debility  Elevated WBC  Admit to tele  Utilize the covid-19 orders panel.  Await PCR results  3/20 improving cliniclaly      HTN (hypertension)    Norvasc  Cardiac diet    DMII (diabetes mellitus, type 2)    ISS  DM diet    Fever  Tylenol  Avoid NSAID's.  monitor  3/20 no fever today    Cough  Could be related to viral syndrome  F/u pending labs  3/20 improving        VTE Risk Mitigation (From admission, onward)         Ordered     enoxaparin injection 40 mg  Daily      03/20/20 1127     IP VTE HIGH RISK PATIENT  Once      03/19/20 4244                      Pedrito Galvan DO  Department of Hospital Medicine   [facility]

## 2020-03-20 NOTE — ASSESSMENT & PLAN NOTE
Cough  Fever  Debility  Elevated WBC  Admit to tele  Utilize the covid-19 orders panel.  Await PCR results  3/20 improving cliniclaly

## 2020-03-20 NOTE — PLAN OF CARE
VN cued into room for q2h rounding.  Pt resting comfortably in bed.  No needs or complaints at this time.   Inquired about immunization status, pt stated that he thinks he got the flu and pneumonia vaccines this season. VN will continue to follow and be available as needed.

## 2020-03-20 NOTE — PLAN OF CARE
[place] spoke with pt per telephone   used services of language line - Daniel  # [phone]     pt independent prior to admit, no hh, no dme     pt has transportation to home     receives pcp care and uses pharmacy services of [facility].    - Dr. Lorie Sims        03/20/20 1323   Discharge Assessment   Assessment Type Discharge Planning Assessment  (interview conducted per phone with Daniel,  # [id number] )   Confirmed/corrected address and phone number on facesheet? Yes   Assessment information obtained from? Patient   Expected Length of Stay (days) 2   Communicated expected length of stay with patient/caregiver yes   Prior to hospitilization cognitive status: Alert/Oriented   Prior to hospitalization functional status: Independent   Current cognitive status: Alert/Oriented   Current Functional Status: Independent   Lives With spouse  (wife Treva [phone] )   Able to Return to Prior Arrangements yes   Is patient able to care for self after discharge? Yes   Who are your caregiver(s) and their phone number(s)?   (wife Treva [phone] )   Patient's perception of discharge disposition home or selfcare   Readmission Within the Last 30 Days no previous admission in last 30 days   Patient currently being followed by outpatient case management? No   Patient currently receives any other outside agency services? No   Equipment Currently Used at Home none   Do you have any problems affording any of your prescribed medications? No   Is the patient taking medications as prescribed? yes   Does the patient have transportation home? Yes   Transportation Anticipated family or friend will provide   Does the patient receive services at the [facility]? No   Discharge Plan A Home;Home with family   DME Needed Upon Discharge  none   Patient/Family in Agreement with Plan yes

## 2020-03-20 NOTE — PLAN OF CARE
Patient is awake, alert and oriented.  Denies pain.  Denies shortness of breath.  Safety maintained.  Will continue to monitor.

## 2020-03-20 NOTE — PLAN OF CARE
Problem: Fall Injury Risk  Goal: Absence of Fall and Fall-Related Injury  Outcome: Ongoing, Progressing     Problem: Adult Inpatient Plan of Care  Goal: Plan of Care Review  Outcome: Ongoing, Progressing  Goal: Absence of Hospital-Acquired Illness or Injury  Outcome: Ongoing, Progressing

## 2020-03-21 LAB
BILIRUB UR QL STRIP: NEGATIVE
CLARITY UR: CLEAR
COLOR UR: YELLOW
GLUCOSE UR QL STRIP: NEGATIVE
HGB UR QL STRIP: ABNORMAL
KETONES UR QL STRIP: NEGATIVE
LEUKOCYTE ESTERASE UR QL STRIP: NEGATIVE
NITRITE UR QL STRIP: NEGATIVE
PH UR STRIP: 6 [PH] (ref 5–8)
POCT GLUCOSE: 152 MG/DL (ref 70–110)
POCT GLUCOSE: 177 MG/DL (ref 70–110)
POCT GLUCOSE: 198 MG/DL (ref 70–110)
POCT GLUCOSE: 204 MG/DL (ref 70–110)
PROT UR QL STRIP: ABNORMAL
SP GR UR STRIP: 1.01 (ref 1–1.03)
URN SPEC COLLECT METH UR: ABNORMAL
UROBILINOGEN UR STRIP-ACNC: 1 EU/DL

## 2020-03-21 PROCEDURE — 63600175 PHARM REV CODE 636 W HCPCS: Performed by: HOSPITALIST

## 2020-03-21 PROCEDURE — 25000003 PHARM REV CODE 250: Performed by: HOSPITALIST

## 2020-03-21 PROCEDURE — 11000001 HC ACUTE MED/SURG PRIVATE ROOM

## 2020-03-21 PROCEDURE — 81003 URINALYSIS AUTO W/O SCOPE: CPT

## 2020-03-21 RX ADMIN — Medication 6 MG: at 09:03

## 2020-03-21 RX ADMIN — POLYETHYLENE GLYCOL 3350 17 G: 17 POWDER, FOR SOLUTION ORAL at 09:03

## 2020-03-21 RX ADMIN — CEFTRIAXONE 1 G: 1 INJECTION, SOLUTION INTRAVENOUS at 09:03

## 2020-03-21 RX ADMIN — AMLODIPINE BESYLATE 5 MG: 5 TABLET ORAL at 09:03

## 2020-03-21 RX ADMIN — ENOXAPARIN SODIUM 40 MG: 100 INJECTION SUBCUTANEOUS at 04:03

## 2020-03-21 RX ADMIN — ACETAMINOPHEN 650 MG: 325 TABLET ORAL at 09:03

## 2020-03-21 NOTE — PLAN OF CARE
VN cued into room for q2h rounding.  Pt resting comfortably in bed.  Staff currently in room assisting patient.  No needs or complaints at this time.  VN will continue to follow and be available as needed.

## 2020-03-21 NOTE — PROGRESS NOTES
"[facility] Hospital Medicine  Progress Note    Patient Name: Antoine Ghosh  MRN: [id number]  Patient Class: IP- Inpatient   Admission Date: 3/19/2020  Length of Stay: 2 days  Attending Physician: Pedrito Gavlan DO  Primary Care Provider: Thomas        Subjective:     Principal Problem:Suspected Covid-19 Virus Infection        HPI:  The patient is a 64 years old male patient with previous medical history significant for  DMII and HTN, the pt presented to the ED for left side back pain since this morning.    The patient denies trauma.  Pt also reports cough "for a while."  No CP or SOB.  No n/v/d, recent travel, or known sick contacts.  He has taken no medications for his symptoms.  No other complaints at this time.  The pt evaluated on admission, he is comfortable some few coughs that are dry.  Denies having anyone sick at home    Overview/Hospital Course:  3/20 doing better, less SOB, improving cough, COVID test is pending  3/21 The patient was evaluated via virtual rounding, discussion with nurses and reviewing VS, notes was part of this evaluation.  Pt feeling beeter, less cough less SOB, some chest pain on coughing/ non cardiac    Interval Historyimproving overall, less cough and SOB, no acute matters.  Evaluated visrtually  Review of Systems   Constitutional: Negative for activity change, fatigue and fever.   Respiratory: Negative for cough and shortness of breath.    Cardiovascular: Negative for chest pain and leg swelling.   Gastrointestinal: Negative for diarrhea, nausea and vomiting.   Neurological: Negative for dizziness and light-headedness.     Objective:     Vital Signs (Most Recent):  Temp: 98 °F (36.7 °C) (03/21/20 1607)  Pulse: 89 (03/21/20 1607)  Resp: 18 (03/21/20 1607)  BP: 134/66 (03/21/20 1607)  SpO2: 97 % (03/19/20 1802) Vital Signs (24h Range):  Temp:  [98 °F (36.7 °C)-99.2 °F (37.3 °C)] 98 °F (36.7 °C)  Pulse:  [] 89  Resp:  [17-20] 18  BP: " (133-157)/(66-86) 134/66     Weight: 83 kg (182 lb 15.7 oz)  Body mass index is 27.82 kg/m².    Intake/Output Summary (Last 24 hours) at 3/21/2020 1819  Last data filed at 3/21/2020 1720  Gross per 24 hour   Intake 340 ml   Output 900 ml   Net -560 ml      Physical Exam   Constitutional: He is oriented to person, place, and time. He appears well-developed and well-nourished.   HENT:   Head: Atraumatic.   Eyes: EOM are normal.   Neck: Normal range of motion.   Pulmonary/Chest: No respiratory distress.   Musculoskeletal: Normal range of motion.   Neurological: He is alert and oriented to person, place, and time.   Psychiatric: He has a normal mood and affect. His behavior is normal. Judgment and thought content normal.   Nursing note and vitals reviewed.      Significant Labs:   Recent Labs   Lab 03/19/20  1236 03/20/20  0532   WBC 21.55* 19.13*   HGB 10.5* 9.6*   HCT 29.3* 28.0*    302     Recent Labs   Lab 03/19/20  1236 03/20/20  0532    139   K 4.8 4.3    107   CO2 21* 23   BUN 32* 28*   CREATININE 2.1* 1.6*   * 136*   CALCIUM 9.6 9.2     Recent Labs   Lab 03/19/20  1236   ALKPHOS 134   ALT 12   AST 12   ALBUMIN 3.7   PROT 8.6*   BILITOT 0.3      Recent Labs     03/19/20  2204   TROPONINI 0.064*     Recent Labs   Lab 03/20/20  1230 03/20/20  1709 03/20/20 2010 03/21/20  0439 03/21/20  1148 03/21/20  1603   POCTGLUCOSE 165* 222* 180* 152* 198* 177*     No results found for: HGBA1C  Scheduled Meds:   amLODIPine  5 mg Oral Daily    cefTRIAXone (ROCEPHIN) IVPB  1 g Intravenous Q24H    enoxaparin  40 mg Subcutaneous Daily    polyethylene glycol  17 g Oral Daily     Continuous Infusions:  As Needed: acetaminophen, acetaminophen, Dextrose 10% Bolus, Dextrose 10% Bolus, glucagon (human recombinant), glucose, glucose, insulin aspart U-100, melatonin, sodium chloride 0.9%    Significant Imaging:   X-Ray Chest 1 View  Narrative: EXAMINATION:  XR CHEST 1 VIEW    CLINICAL  HISTORY:  Cough    TECHNIQUE:  Single frontal view of the chest was performed.    COMPARISON:  None    FINDINGS:  The cardiac silhouette is normal in size and midline.  No focal airspace disease.  No pleural effusion.  No pneumothorax.  The osseous structures appear normal.  Impression: No definite acute intrathoracic process seen    Electronically signed by: Brittany Clifford MD  Date:    03/19/2020  Time:    11:19          Assessment/Plan:      * Suspected Covid-19 Virus Infection  Cough  Fever  Debility  Elevated WBC  Admit to tele  Utilize the covid-19 orders panel.  Await PCR results  3/20 improving cliniclaly  3/21 continue current POC      HTN (hypertension)    Norvasc  Cardiac diet    DMII (diabetes mellitus, type 2)    ISS  DM diet    Fever  Tylenol  Avoid NSAID's.  monitor  3/20 no fever today  3/21 resolving     Cough  Could be related to viral syndrome  F/u pending labs  3/20 improving        VTE Risk Mitigation (From admission, onward)         Ordered     enoxaparin injection 40 mg  Daily      03/20/20 1127     IP VTE HIGH RISK PATIENT  Once      03/19/20 8360                      Pedrito Galvan DO  Department of Hospital Medicine   [facility]

## 2020-03-21 NOTE — ASSESSMENT & PLAN NOTE
Cough  Fever  Debility  Elevated WBC  Admit to tele  Utilize the covid-19 orders panel.  Await PCR results  3/20 improving cliniclaly  3/21 continue current POC

## 2020-03-21 NOTE — SUBJECTIVE & OBJECTIVE
Interval Historyimproving overall, less cough and SOB, no acute matters.  Evaluated visrtually  Review of Systems   Constitutional: Negative for activity change, fatigue and fever.   Respiratory: Negative for cough and shortness of breath.    Cardiovascular: Negative for chest pain and leg swelling.   Gastrointestinal: Negative for diarrhea, nausea and vomiting.   Neurological: Negative for dizziness and light-headedness.     Objective:     Vital Signs (Most Recent):  Temp: 98 °F (36.7 °C) (03/21/20 1607)  Pulse: 89 (03/21/20 1607)  Resp: 18 (03/21/20 1607)  BP: 134/66 (03/21/20 1607)  SpO2: 97 % (03/19/20 1802) Vital Signs (24h Range):  Temp:  [98 °F (36.7 °C)-99.2 °F (37.3 °C)] 98 °F (36.7 °C)  Pulse:  [] 89  Resp:  [17-20] 18  BP: (133-157)/(66-86) 134/66     Weight: 83 kg (182 lb 15.7 oz)  Body mass index is 27.82 kg/m².    Intake/Output Summary (Last 24 hours) at 3/21/2020 1819  Last data filed at 3/21/2020 1720  Gross per 24 hour   Intake 340 ml   Output 900 ml   Net -560 ml      Physical Exam   Constitutional: He is oriented to person, place, and time. He appears well-developed and well-nourished.   HENT:   Head: Atraumatic.   Eyes: EOM are normal.   Neck: Normal range of motion.   Pulmonary/Chest: No respiratory distress.   Musculoskeletal: Normal range of motion.   Neurological: He is alert and oriented to person, place, and time.   Psychiatric: He has a normal mood and affect. His behavior is normal. Judgment and thought content normal.   Nursing note and vitals reviewed.      Significant Labs:   Recent Labs   Lab 03/19/20  1236 03/20/20  0532   WBC 21.55* 19.13*   HGB 10.5* 9.6*   HCT 29.3* 28.0*    302     Recent Labs   Lab 03/19/20  1236 03/20/20  0532    139   K 4.8 4.3    107   CO2 21* 23   BUN 32* 28*   CREATININE 2.1* 1.6*   * 136*   CALCIUM 9.6 9.2     Recent Labs   Lab 03/19/20  1236   ALKPHOS 134   ALT 12   AST 12   ALBUMIN 3.7   PROT 8.6*   BILITOT 0.3      Recent  Labs     03/19/20  2204   TROPONINI 0.064*     Recent Labs   Lab 03/20/20  1230 03/20/20  1709 03/20/20 2010 03/21/20  0439 03/21/20  1148 03/21/20  1603   POCTGLUCOSE 165* 222* 180* 152* 198* 177*     No results found for: HGBA1C  Scheduled Meds:   amLODIPine  5 mg Oral Daily    cefTRIAXone (ROCEPHIN) IVPB  1 g Intravenous Q24H    enoxaparin  40 mg Subcutaneous Daily    polyethylene glycol  17 g Oral Daily     Continuous Infusions:  As Needed: acetaminophen, acetaminophen, Dextrose 10% Bolus, Dextrose 10% Bolus, glucagon (human recombinant), glucose, glucose, insulin aspart U-100, melatonin, sodium chloride 0.9%    Significant Imaging:   X-Ray Chest 1 View  Narrative: EXAMINATION:  XR CHEST 1 VIEW    CLINICAL HISTORY:  Cough    TECHNIQUE:  Single frontal view of the chest was performed.    COMPARISON:  None    FINDINGS:  The cardiac silhouette is normal in size and midline.  No focal airspace disease.  No pleural effusion.  No pneumothorax.  The osseous structures appear normal.  Impression: No definite acute intrathoracic process seen    Electronically signed by: Brittany Clifford MD  Date:    03/19/2020  Time:    11:19

## 2020-03-21 NOTE — PLAN OF CARE
Alert and oriented x4. Scheduled medications administered per MD order. Prn medication administered for sleep and pain. Continuous pulse ox. Room air. Afebrile. Blood glucose monitored. Voiding without difficulty. Appears to be in no distress. Contact, droplet, and airborne precautions maintained. Safety maintained.

## 2020-03-22 LAB
POCT GLUCOSE: 132 MG/DL (ref 70–110)
POCT GLUCOSE: 165 MG/DL (ref 70–110)
POCT GLUCOSE: 172 MG/DL (ref 70–110)
POCT GLUCOSE: 224 MG/DL (ref 70–110)
SARS-COV-2 RNA RESP QL NAA+PROBE: NOT DETECTED

## 2020-03-22 PROCEDURE — 11000001 HC ACUTE MED/SURG PRIVATE ROOM

## 2020-03-22 PROCEDURE — 25000003 PHARM REV CODE 250: Performed by: HOSPITALIST

## 2020-03-22 PROCEDURE — 63600175 PHARM REV CODE 636 W HCPCS: Performed by: HOSPITALIST

## 2020-03-22 PROCEDURE — 94761 N-INVAS EAR/PLS OXIMETRY MLT: CPT

## 2020-03-22 RX ADMIN — ACETAMINOPHEN 650 MG: 325 TABLET ORAL at 10:03

## 2020-03-22 RX ADMIN — POLYETHYLENE GLYCOL 3350 17 G: 17 POWDER, FOR SOLUTION ORAL at 08:03

## 2020-03-22 RX ADMIN — CEFTRIAXONE 1 G: 1 INJECTION, SOLUTION INTRAVENOUS at 10:03

## 2020-03-22 RX ADMIN — ENOXAPARIN SODIUM 40 MG: 100 INJECTION SUBCUTANEOUS at 04:03

## 2020-03-22 RX ADMIN — AMLODIPINE BESYLATE 5 MG: 5 TABLET ORAL at 08:03

## 2020-03-22 RX ADMIN — Medication 6 MG: at 10:03

## 2020-03-22 RX ADMIN — INSULIN ASPART 1 UNITS: 100 INJECTION, SOLUTION INTRAVENOUS; SUBCUTANEOUS at 10:03

## 2020-03-22 NOTE — PLAN OF CARE
VN cued into room for q2h rounding.  Pt resting comfortably in bed.  No needs or complaints at this time.  Call light within reach, fall precautions maintained.  VN will continue to follow and be available as needed.

## 2020-03-22 NOTE — PLAN OF CARE
Patient AAOx4, VSS, patient free from falls. Patient denies any pain. Patient remains on pulse ox monitor. Call bell in reach. Safety maintained. Will continue to monitor.   Problem: Fall Injury Risk  Goal: Absence of Fall and Fall-Related Injury  Outcome: Ongoing, Progressing     Problem: Adult Inpatient Plan of Care  Goal: Plan of Care Review  Outcome: Ongoing, Progressing  Goal: Patient-Specific Goal (Individualization)  Outcome: Ongoing, Progressing  Goal: Absence of Hospital-Acquired Illness or Injury  Outcome: Ongoing, Progressing  Goal: Optimal Comfort and Wellbeing  Outcome: Ongoing, Progressing  Goal: Readiness for Transition of Care  Outcome: Ongoing, Progressing  Goal: Rounds/Family Conference  Outcome: Ongoing, Progressing     Problem: Infection  Goal: Infection Symptom Resolution  Outcome: Ongoing, Progressing     Problem: Pain Acute  Goal: Optimal Pain Control  Outcome: Ongoing, Progressing     Problem: Fatigue  Goal: Improved Activity Tolerance  Outcome: Ongoing, Progressing

## 2020-03-22 NOTE — PLAN OF CARE
Alert and oriented x4. Scheduled medications administered per MD order. Prn Tylenol administered for back pain. No other complaints at this time. Afebrile. Continuous pulse ox monitoring. Room air. Appears to be in no distress. Safety maintained.

## 2020-03-22 NOTE — PLAN OF CARE
VIRTUAL NURSE:  Cued into patient's room.  Permission received per patient to turn camera to view patient.  Introduced as VN for night shift that will be working with floor nurse and nursing assistant.  Educated patient on VN's role in patient care. Plan of care reviewed with patient. Education per flowsheet.  Opportunity given for questions and questions answered.  Requesting pain medication for back pain 6/10; MAITE Rueda notified.  No other complaints.  Instructed to call for assistance.  Will cont to monitor.    Labs, notes, and orders reviewed.

## 2020-03-22 NOTE — SUBJECTIVE & OBJECTIVE
Interval History: doing better today, afebrile, lab work ordered for tomrrow  Review of Systems   Constitutional: Negative for activity change, fatigue and fever.   Respiratory: Negative for cough and shortness of breath.    Cardiovascular: Negative for chest pain and leg swelling.   Gastrointestinal: Negative for diarrhea, nausea and vomiting.   Neurological: Negative for dizziness and light-headedness.     Objective:     Vital Signs (Most Recent):  Temp: 99.1 °F (37.3 °C) (03/22/20 1721)  Pulse: 91 (03/22/20 1721)  Resp: 18 (03/22/20 1721)  BP: 123/77 (03/22/20 1721)  SpO2: (!) 94 % (03/22/20 0556) Vital Signs (24h Range):  Temp:  [98.7 °F (37.1 °C)-100 °F (37.8 °C)] 99.1 °F (37.3 °C)  Pulse:  [] 91  Resp:  [18-20] 18  SpO2:  [92 %-94 %] 94 %  BP: (123-142)/(67-80) 123/77     Weight: 81.8 kg (180 lb 5.4 oz)  Body mass index is 27.42 kg/m².    Intake/Output Summary (Last 24 hours) at 3/22/2020 1836  Last data filed at 3/22/2020 1629  Gross per 24 hour   Intake 50 ml   Output 650 ml   Net -600 ml      Physical Exam   Constitutional: He is oriented to person, place, and time. He appears well-developed and well-nourished.   HENT:   Head: Atraumatic.   Eyes: EOM are normal.   Neck: Normal range of motion.   Pulmonary/Chest: Effort normal. No respiratory distress.   Musculoskeletal: Normal range of motion.   Neurological: He is alert and oriented to person, place, and time.   Psychiatric: He has a normal mood and affect. His behavior is normal. Judgment and thought content normal.   Nursing note and vitals reviewed.      Significant Labs:   Recent Labs   Lab 03/19/20  1236 03/20/20  0532   WBC 21.55* 19.13*   HGB 10.5* 9.6*   HCT 29.3* 28.0*    302     Recent Labs   Lab 03/19/20  1236 03/20/20  0532    139   K 4.8 4.3    107   CO2 21* 23   BUN 32* 28*   CREATININE 2.1* 1.6*   * 136*   CALCIUM 9.6 9.2     Recent Labs   Lab 03/19/20  1236   ALKPHOS 134   ALT 12   AST 12   ALBUMIN 3.7   PROT  8.6*   BILITOT 0.3      Recent Labs     03/19/20  2204   TROPONINI 0.064*     Recent Labs   Lab 03/21/20  1148 03/21/20  1603 03/21/20  2210 03/22/20  0503 03/22/20  1255 03/22/20  1712   POCTGLUCOSE 198* 177* 204* 172* 165* 132*     No results found for: HGBA1C  Scheduled Meds:   amLODIPine  5 mg Oral Daily    cefTRIAXone (ROCEPHIN) IVPB  1 g Intravenous Q24H    enoxaparin  40 mg Subcutaneous Daily    polyethylene glycol  17 g Oral Daily     Continuous Infusions:  As Needed: acetaminophen, acetaminophen, Dextrose 10% Bolus, Dextrose 10% Bolus, glucagon (human recombinant), glucose, glucose, insulin aspart U-100, melatonin, sodium chloride 0.9%    Significant Imaging:     No new imaging

## 2020-03-22 NOTE — ASSESSMENT & PLAN NOTE
Cough  Fever  Debility  Elevated WBC  Admit to tele  Utilize the covid-19 orders panel.  Await PCR results  3/20 improving cliniclaly  3/21 continue current POC  3/22 F/U results tomorrow  Afebrile  Improving slowly.  Possible dc home tomorrow

## 2020-03-22 NOTE — PROGRESS NOTES
"[facility] Hospital Medicine  Progress Note    Patient Name: Antoine Ghosh  MRN: [id number]  Patient Class: IP- Inpatient   Admission Date: 3/19/2020  Length of Stay: 3 days  Attending Physician: Pedrito Galvan DO  Primary Care Provider: Thomas        Subjective:     Principal Problem:Suspected Covid-19 Virus Infection        HPI:  The patient is a 64 years old male patient with previous medical history significant for  DMII and HTN, the pt presented to the ED for left side back pain since this morning.    The patient denies trauma.  Pt also reports cough "for a while."  No CP or SOB.  No n/v/d, recent travel, or known sick contacts.  He has taken no medications for his symptoms.  No other complaints at this time.  The pt evaluated on admission, he is comfortable some few coughs that are dry.  Denies having anyone sick at home    Overview/Hospital Course:  3/20 doing better, less SOB, improving cough, COVID test is pending  3/21 The patient was evaluated via virtual rounding, discussion with nurses and reviewing VS, notes was part of this evaluation.  Pt feeling beeter, less cough less SOB, some chest pain on coughing/ non cardiac  3/22 pt seen, has no headache no cough, no shortness of breath, asking when to go home, might be tomorrow. Appetite is good    Interval History: doing better today, afebrile, lab work ordered for tomrrow  Review of Systems   Constitutional: Negative for activity change, fatigue and fever.   Respiratory: Negative for cough and shortness of breath.    Cardiovascular: Negative for chest pain and leg swelling.   Gastrointestinal: Negative for diarrhea, nausea and vomiting.   Neurological: Negative for dizziness and light-headedness.     Objective:     Vital Signs (Most Recent):  Temp: 99.1 °F (37.3 °C) (03/22/20 1721)  Pulse: 91 (03/22/20 1721)  Resp: 18 (03/22/20 1721)  BP: 123/77 (03/22/20 1721)  SpO2: (!) 94 % (03/22/20 0556) Vital Signs (24h " Range):  Temp:  [98.7 °F (37.1 °C)-100 °F (37.8 °C)] 99.1 °F (37.3 °C)  Pulse:  [] 91  Resp:  [18-20] 18  SpO2:  [92 %-94 %] 94 %  BP: (123-142)/(67-80) 123/77     Weight: 81.8 kg (180 lb 5.4 oz)  Body mass index is 27.42 kg/m².    Intake/Output Summary (Last 24 hours) at 3/22/2020 1836  Last data filed at 3/22/2020 1629  Gross per 24 hour   Intake 50 ml   Output 650 ml   Net -600 ml      Physical Exam   Constitutional: He is oriented to person, place, and time. He appears well-developed and well-nourished.   HENT:   Head: Atraumatic.   Eyes: EOM are normal.   Neck: Normal range of motion.   Pulmonary/Chest: Effort normal. No respiratory distress.   Musculoskeletal: Normal range of motion.   Neurological: He is alert and oriented to person, place, and time.   Psychiatric: He has a normal mood and affect. His behavior is normal. Judgment and thought content normal.   Nursing note and vitals reviewed.      Significant Labs:   Recent Labs   Lab 03/19/20  1236 03/20/20  0532   WBC 21.55* 19.13*   HGB 10.5* 9.6*   HCT 29.3* 28.0*    302     Recent Labs   Lab 03/19/20  1236 03/20/20  0532    139   K 4.8 4.3    107   CO2 21* 23   BUN 32* 28*   CREATININE 2.1* 1.6*   * 136*   CALCIUM 9.6 9.2     Recent Labs   Lab 03/19/20  1236   ALKPHOS 134   ALT 12   AST 12   ALBUMIN 3.7   PROT 8.6*   BILITOT 0.3      Recent Labs     03/19/20  2204   TROPONINI 0.064*     Recent Labs   Lab 03/21/20  1148 03/21/20  1603 03/21/20  2210 03/22/20  0503 03/22/20  1255 03/22/20  1712   POCTGLUCOSE 198* 177* 204* 172* 165* 132*     No results found for: HGBA1C  Scheduled Meds:   amLODIPine  5 mg Oral Daily    cefTRIAXone (ROCEPHIN) IVPB  1 g Intravenous Q24H    enoxaparin  40 mg Subcutaneous Daily    polyethylene glycol  17 g Oral Daily     Continuous Infusions:  As Needed: acetaminophen, acetaminophen, Dextrose 10% Bolus, Dextrose 10% Bolus, glucagon (human recombinant), glucose, glucose, insulin aspart U-100,  melatonin, sodium chloride 0.9%    Significant Imaging:     No new imaging        Assessment/Plan:      * Suspected Covid-19 Virus Infection  Cough  Fever  Debility  Elevated WBC  Admit to tele  Utilize the covid-19 orders panel.  Await PCR results  3/20 improving cliniclaly  3/21 continue current POC  3/22 F/U results tomorrow  Afebrile  Improving slowly.  Possible dc home tomorrow      HTN (hypertension)    Norvasc  Cardiac diet    DMII (diabetes mellitus, type 2)    ISS  DM diet  3/22 acceptable accu check    Fever  Tylenol  Avoid NSAID's.  monitor  3/20 no fever today  3/21 resolving   3/22 no fver in 24 hrs    Cough  Could be related to viral syndrome  F/u pending labs  3/20 improving  3/22 resolving        VTE Risk Mitigation (From admission, onward)         Ordered     enoxaparin injection 40 mg  Daily      03/20/20 1127     IP VTE HIGH RISK PATIENT  Once      03/19/20 8110                      Pedrito Galvan DO  Department of Hospital Medicine   [facility]

## 2020-03-23 VITALS
OXYGEN SATURATION: 93 % | BODY MASS INDEX: 27.63 KG/M2 | SYSTOLIC BLOOD PRESSURE: 125 MMHG | HEIGHT: 68 IN | TEMPERATURE: 99 F | DIASTOLIC BLOOD PRESSURE: 72 MMHG | RESPIRATION RATE: 20 BRPM | WEIGHT: 182.31 LBS | HEART RATE: 84 BPM

## 2020-03-23 LAB
ANION GAP SERPL CALC-SCNC: 11 MMOL/L (ref 8–16)
BASOPHILS # BLD AUTO: 0.04 K/UL (ref 0–0.2)
BASOPHILS NFR BLD: 0.3 % (ref 0–1.9)
BUN SERPL-MCNC: 33 MG/DL (ref 8–23)
CALCIUM SERPL-MCNC: 9.6 MG/DL (ref 8.7–10.5)
CHLORIDE SERPL-SCNC: 103 MMOL/L (ref 95–110)
CO2 SERPL-SCNC: 22 MMOL/L (ref 23–29)
CREAT SERPL-MCNC: 1.8 MG/DL (ref 0.5–1.4)
DIFFERENTIAL METHOD: ABNORMAL
EOSINOPHIL # BLD AUTO: 0.5 K/UL (ref 0–0.5)
EOSINOPHIL NFR BLD: 3.3 % (ref 0–8)
ERYTHROCYTE [DISTWIDTH] IN BLOOD BY AUTOMATED COUNT: 12.6 % (ref 11.5–14.5)
EST. GFR  (AFRICAN AMERICAN): 45 ML/MIN/1.73 M^2
EST. GFR  (NON AFRICAN AMERICAN): 39 ML/MIN/1.73 M^2
ESTIMATED AVG GLUCOSE: 154 MG/DL (ref 68–131)
GLUCOSE SERPL-MCNC: 171 MG/DL (ref 70–110)
HBA1C MFR BLD HPLC: 7 % (ref 4–5.6)
HCT VFR BLD AUTO: 28.1 % (ref 40–54)
HGB BLD-MCNC: 9.2 G/DL (ref 14–18)
IMM GRANULOCYTES # BLD AUTO: 0.12 K/UL (ref 0–0.04)
IMM GRANULOCYTES NFR BLD AUTO: 0.8 % (ref 0–0.5)
LYMPHOCYTES # BLD AUTO: 1.8 K/UL (ref 1–4.8)
LYMPHOCYTES NFR BLD: 11.5 % (ref 18–48)
MCH RBC QN AUTO: 28.5 PG (ref 27–31)
MCHC RBC AUTO-ENTMCNC: 32.7 G/DL (ref 32–36)
MCV RBC AUTO: 87 FL (ref 82–98)
MONOCYTES # BLD AUTO: 1.2 K/UL (ref 0.3–1)
MONOCYTES NFR BLD: 7.8 % (ref 4–15)
NEUTROPHILS # BLD AUTO: 12.1 K/UL (ref 1.8–7.7)
NEUTROPHILS NFR BLD: 76.3 % (ref 38–73)
NRBC BLD-RTO: 0 /100 WBC
PLATELET # BLD AUTO: 322 K/UL (ref 150–350)
PMV BLD AUTO: 9.7 FL (ref 9.2–12.9)
POCT GLUCOSE: 158 MG/DL (ref 70–110)
POCT GLUCOSE: 179 MG/DL (ref 70–110)
POTASSIUM SERPL-SCNC: 4.3 MMOL/L (ref 3.5–5.1)
RBC # BLD AUTO: 3.23 M/UL (ref 4.6–6.2)
SODIUM SERPL-SCNC: 136 MMOL/L (ref 136–145)
WBC # BLD AUTO: 15.87 K/UL (ref 3.9–12.7)

## 2020-03-23 PROCEDURE — 85025 COMPLETE CBC W/AUTO DIFF WBC: CPT

## 2020-03-23 PROCEDURE — 80048 BASIC METABOLIC PNL TOTAL CA: CPT

## 2020-03-23 PROCEDURE — 25000003 PHARM REV CODE 250: Performed by: HOSPITALIST

## 2020-03-23 PROCEDURE — 36415 COLL VENOUS BLD VENIPUNCTURE: CPT

## 2020-03-23 PROCEDURE — 83036 HEMOGLOBIN GLYCOSYLATED A1C: CPT

## 2020-03-23 RX ORDER — LEVOFLOXACIN 500 MG/1
500 TABLET, FILM COATED ORAL DAILY
Qty: 5 TABLET | Refills: 0 | Status: SHIPPED | OUTPATIENT
Start: 2020-03-23 | End: 2020-03-28

## 2020-03-23 RX ORDER — ATORVASTATIN CALCIUM 40 MG/1
40 TABLET, FILM COATED ORAL DAILY
COMMUNITY

## 2020-03-23 RX ADMIN — AMLODIPINE BESYLATE 5 MG: 5 TABLET ORAL at 08:03

## 2020-03-23 RX ADMIN — POLYETHYLENE GLYCOL 3350 17 G: 17 POWDER, FOR SOLUTION ORAL at 08:03

## 2020-03-23 NOTE — ASSESSMENT & PLAN NOTE
- Temp 102.4 upon admission  - COVID ruled out  - treated with IV Rocephin and will transition to oral abx Tylenol  - Tmax 99.1 overnight

## 2020-03-23 NOTE — PLAN OF CARE
Problem: Fall Injury Risk  Goal: Absence of Fall and Fall-Related Injury  Outcome: Ongoing, Progressing     Problem: Adult Inpatient Plan of Care  Goal: Plan of Care Review  Outcome: Ongoing, Progressing

## 2020-03-23 NOTE — PLAN OF CARE
Problem: Diabetes Comorbidity  Goal: Blood Glucose Level Within Desired Range  Outcome: Ongoing, Progressing     Problem: Airway Clearance Ineffective  Goal: Effective Airway Clearance  Outcome: Ongoing, Progressing

## 2020-03-23 NOTE — ASSESSMENT & PLAN NOTE
- HgbA1c pending  - on Lantus at home-will verify dose  - CBGs 132-224 overnight  - continue SSI prn; accuchecks AC and HS as well as diabetic diet

## 2020-03-23 NOTE — PLAN OF CARE
Discharge orders noted. Additional clinical references attached. Patient's discharge instructions given by bedside RN and reviewed via VN with the assistance from huber Kwok. Education provided on new and previous medications, diagnosis, and follow-up appointments. New medications delivered by pharmacy. Patient verbalized understanding. Patient's ride/transportation on the way. All questions answered. Floor nurse notified.

## 2020-03-23 NOTE — PROGRESS NOTES
"[facility] Hospital Medicine  Progress Note    Patient Name: Antoine Ghosh  MRN: [id number]  Patient Class: IP- Inpatient   Admission Date: 3/19/2020  Length of Stay: 4 days  Attending Physician: Pedrito Galvan DO  Primary Care Provider: Ly Of Jian        Subjective:     Principal Problem:Suspected Covid-19 Virus Infection        HPI:  The patient is a 64 years old male patient with previous medical history significant for  DMII and HTN, the pt presented to the ED for left side back pain since this morning.    The patient denies trauma.  Pt also reports cough "for a while."  No CP or SOB.  No n/v/d, recent travel, or known sick contacts.  He has taken no medications for his symptoms.  No other complaints at this time.  The pt evaluated on admission, he is comfortable some few coughs that are dry.  Denies having anyone sick at home    Overview/Hospital Course:  3/20/2020 doing better, less SOB, improving cough, COVID test is pending  3/21/2020 The patient was evaluated via virtual rounding, discussion with nurses and reviewing VS, notes was part of this evaluation.  Pt feeling beeter, less cough less SOB, some chest pain on coughing/ non cardiac  3/22/2020 pt seen, has no headache no cough, no shortness of breath, asking when to go home, might be tomorrow. Appetite is good3/23/2020 WBCs 19K on 3/20/2020 and down furtehr to 15K this AM. COVID 19 negative. On IV Rocephin daily with continued cough although improving. Hopeful to discharge today on oral abx          Review of Systems   Constitutional: Negative for activity change, appetite change, chills, diaphoresis, fatigue, fever and unexpected weight change.   Respiratory: Positive for cough. Negative for chest tightness, shortness of breath, wheezing and stridor.    Cardiovascular: Negative for chest pain and leg swelling.   Gastrointestinal: Negative for abdominal distention, abdominal pain, blood in stool, diarrhea, " nausea and vomiting.   Endocrine: Negative for cold intolerance, heat intolerance, polydipsia, polyphagia and polyuria.   Musculoskeletal: Positive for back pain. Negative for neck pain and neck stiffness.   Skin: Negative for color change, pallor and rash.   Neurological: Negative for dizziness.     Objective:     Vital Signs (Most Recent):  Temp: 98 °F (36.7 °C) (03/23/20 0738)  Pulse: 92 (03/23/20 0812)  Resp: 20 (03/23/20 0738)  BP: 125/78 (03/23/20 0738)  SpO2: (!) 93 % (03/22/20 2052) Vital Signs (24h Range):  Temp:  [98 °F (36.7 °C)-99.4 °F (37.4 °C)] 98 °F (36.7 °C)  Pulse:  [84-99] 92  Resp:  [18-20] 20  SpO2:  [93 %] 93 %  BP: (123-142)/(67-78) 125/78     Weight: 82.7 kg (182 lb 5.1 oz)  Body mass index is 27.72 kg/m².    Intake/Output Summary (Last 24 hours) at 3/23/2020 1100  Last data filed at 3/23/2020 0800  Gross per 24 hour   Intake 320 ml   Output 500 ml   Net -180 ml      Physical Exam   Constitutional: He is oriented to person, place, and time. He appears well-developed and well-nourished. No distress.   Cardiovascular: Normal heart sounds. A regularly irregular rhythm present. Tachycardia present. Exam reveals no gallop and no friction rub.   No murmur heard.  Pulmonary/Chest: Effort normal. No stridor. No respiratory distress. He has decreased breath sounds in the right lower field and the left lower field. He has no wheezes. He has no rales. He exhibits no tenderness.   Abdominal: Soft. Bowel sounds are normal. He exhibits no distension. There is no tenderness.   Musculoskeletal: Normal range of motion.   Neurological: He is alert and oriented to person, place, and time.   Skin: Skin is warm and dry.   Psychiatric: He has a normal mood and affect.       Significant Labs:     Recent Labs   Lab 03/23/20  0852   WBC 15.87*   RBC 3.23*   HGB 9.2*   HCT 28.1*      MCV 87   MCH 28.5   MCHC 32.7     Recent Labs   Lab 03/23/20  0852      K 4.3      CO2 22*   BUN 33*   CREATININE 1.8*            Assessment/Plan:      Brief HPI: Seen this morning on AM NP rounds while resting in bed. Continues to complain of cough and mild back pain. Discussed POC as detailed below-verbalized understanding and agrees with POC   * Suspected Covid-19 Virus Infection  - presented with back pain along with cough   - respiratory viral panel negative; COVID 19 negative as well; procal 25.70 and lactic acid 1.7 upon admission   - taken off isolation  - continue management for possible PNA Fever      HTN (hypertension)  - on Norvasc at home  - BP 120s-140s/70s overnight  - stable; continue home medication regimen       DMII (diabetes mellitus, type 2)  - HgbA1c pending  - on Lantus at home-will verify dose  - CBGs 132-224 overnight  - continue SSI prn; accuchecks AC and HS as well as diabetic diet       Fever  - Temp 102.4 upon admission  - COVID ruled out  - treated with IV Rocephin and will transition to oral abx Tylenol  - Tmax 99.1 overnight     Cough  - presented with cough somewhat chronic in nature;  Temp 102.4 upon admission   - viral panel and COVID 19 negative  - WBCs 21K with trend down to 19K-15K  - on IV Rocephin and will likely transition to oral abx and discharge later today          VTE Risk Mitigation (From admission, onward)         Ordered     enoxaparin injection 40 mg  Daily      03/20/20 1127     IP VTE HIGH RISK PATIENT  Once      03/19/20 1038                      CHELSEA Pruett, ANP  Department of Hospital Medicine   [facility]

## 2020-03-23 NOTE — DISCHARGE SUMMARY
"[facility] Hospital Medicine  Discharge Summary      Patient Name: Antoine Ghsoh  MRN: [id number]  Admission Date: 3/19/2020  Hospital Length of Stay: 4 days  Discharge Date and Time: 3/23/2020  Attending Physician: Pedrito Galvan DO   Discharging Provider: CHELSEA Pruett, ANP  Primary Care Provider: Ly Of Jian        HPI: The patient is a 64 years old male patient with previous medical history significant for  DMII and HTN, the pt presented to the ED for left side back pain since this morning.    The patient denies trauma.  Pt also reports cough "for a while."  No CP or SOB.  No n/v/d, recent travel, or known sick contacts.  He has taken no medications for his symptoms.  No other complaints at this time.  The pt evaluated on admission, he is comfortable some few coughs that are dry.  Denies having anyone sick at home    * No surgery found *      Hospital Course: 3/20/2020 doing better, less SOB, improving cough, COVID test is pending  3/21/2020 The patient was evaluated via virtual rounding, discussion with nurses and reviewing VS, notes was part of this evaluation.  Pt feeling beeter, less cough less SOB, some chest pain on coughing/ non cardiac  3/22/2020 pt seen, has no headache no cough, no shortness of breath, asking when to go home, might be tomorrow. Appetite is good3/23/2020 WBCs 19K on 3/20/2020 and down furtehr to 15K this AM. COVID 19 negative. On IV Rocephin daily with continued cough although improving. Hopeful to discharge today on oral abx. Ambulated around room and in hallway with no complaints. Deemed ready for discharge and sent home on current medication regimen with Lantus and Norvasc. Will treat with Levaquin 500mg po daily for 5 days for suspicion of CAP. Recommend follow up with PCP once feasible after COVID crisis    Consults:     Final Active Diagnoses:    Diagnosis Date Noted POA    PRINCIPAL PROBLEM:  Suspected Covid-19 Virus Infection " [R68.89] 03/19/2020 Yes    Cough [R05] 03/19/2020 Yes    Fever [R50.9] 03/19/2020 Yes    DMII (diabetes mellitus, type 2) [E11.9] 03/19/2020 Yes    HTN (hypertension) [I10] 03/19/2020 Yes      Problems Resolved During this Admission:      Discharged Condition: good    Disposition: Home or Self Care    Follow Up:  Follow-up Information     Daughters Guzman Jian On 3/31/2020.    Why:  11:15 am     your discharge summary will be faxed to the office  --  fax #   [phone]   Contact information:  [address]  [facility] [address]  [phone]                 Patient Instructions:      Diet diabetic     Diet Cardiac     Activity as tolerated     Medications:  Reconciled Home Medications:      Medication List      START taking these medications    levoFLOXacin 500 MG tablet  Commonly known as:  LEVAQUIN  Take 1 tablet (500 mg total) by mouth once daily. for 5 days        CONTINUE taking these medications    amLODIPine 5 MG tablet  Commonly known as:  NORVASC  Take 5 mg by mouth once daily.     insulin glargine 100 unit/mL injection  Commonly known as:  LANTUS  Inject 30 Units into the skin every evening.            Significant Diagnostic Studies: Labs and CXR     Pending Diagnostic Studies:     None        Indwelling Lines/Drains at time of discharge:   Lines/Drains/Airways     None                 Time spent on the discharge of patient: 45 minutes  Patient was seen and examined on the date of discharge and determined to be suitable for discharge.         CHELSEA Pruett, ANP  Department of Hospital Medicine  [facility]

## 2020-03-23 NOTE — ASSESSMENT & PLAN NOTE
- presented with back pain along with cough   - respiratory viral panel negative; COVID 19 negative as well; procal 25.70 and lactic acid 1.7 upon admission   - taken off isolation  - continue management for possible PNA Fever

## 2020-03-23 NOTE — PLAN OF CARE
Every 2 hour frequent check completed.  Lights out. Patient resting. No distress noted.   All safety measures maintained.

## 2020-03-23 NOTE — NURSING
IV removed with catheter tip intact, pressure dressing applied. AVS given to pt. VN to review d/c instructions,  at bedside. NAD noted. Pt awaiting wheelchair transport by pt escort.

## 2020-03-23 NOTE — ASSESSMENT & PLAN NOTE
- presented with cough somewhat chronic in nature;  Temp 102.4 upon admission   - viral panel and COVID 19 negative  - WBCs 21K with trend down to 19K-15K  - on IV Rocephin and will likely transition to oral abx and discharge later today

## 2020-03-23 NOTE — PLAN OF CARE
VN cued into patients room for q2h rounding - Patient resting appears in no apparent distress. Will continue to monitor closely.closely.

## 2020-03-23 NOTE — PLAN OF CARE
VN cued into patients room for q2h rounding - patient resting in bed in no acute distress at this time. Call bell within reach. Will continue to monitor and be available to intervene PRN.

## 2020-03-23 NOTE — SUBJECTIVE & OBJECTIVE
Review of Systems   Constitutional: Negative for activity change, appetite change, chills, diaphoresis, fatigue, fever and unexpected weight change.   Respiratory: Positive for cough. Negative for chest tightness, shortness of breath, wheezing and stridor.    Cardiovascular: Negative for chest pain and leg swelling.   Gastrointestinal: Negative for abdominal distention, abdominal pain, blood in stool, diarrhea, nausea and vomiting.   Endocrine: Negative for cold intolerance, heat intolerance, polydipsia, polyphagia and polyuria.   Musculoskeletal: Positive for back pain. Negative for neck pain and neck stiffness.   Skin: Negative for color change, pallor and rash.   Neurological: Negative for dizziness.     Objective:     Vital Signs (Most Recent):  Temp: 98 °F (36.7 °C) (03/23/20 0738)  Pulse: 92 (03/23/20 0812)  Resp: 20 (03/23/20 0738)  BP: 125/78 (03/23/20 0738)  SpO2: (!) 93 % (03/22/20 2052) Vital Signs (24h Range):  Temp:  [98 °F (36.7 °C)-99.4 °F (37.4 °C)] 98 °F (36.7 °C)  Pulse:  [84-99] 92  Resp:  [18-20] 20  SpO2:  [93 %] 93 %  BP: (123-142)/(67-78) 125/78     Weight: 82.7 kg (182 lb 5.1 oz)  Body mass index is 27.72 kg/m².    Intake/Output Summary (Last 24 hours) at 3/23/2020 1100  Last data filed at 3/23/2020 0800  Gross per 24 hour   Intake 320 ml   Output 500 ml   Net -180 ml      Physical Exam   Constitutional: He is oriented to person, place, and time. He appears well-developed and well-nourished. No distress.   Cardiovascular: Normal heart sounds. A regularly irregular rhythm present. Tachycardia present. Exam reveals no gallop and no friction rub.   No murmur heard.  Pulmonary/Chest: Effort normal. No stridor. No respiratory distress. He has decreased breath sounds in the right lower field and the left lower field. He has no wheezes. He has no rales. He exhibits no tenderness.   Abdominal: Soft. Bowel sounds are normal. He exhibits no distension. There is no tenderness.   Musculoskeletal:  Normal range of motion.   Neurological: He is alert and oriented to person, place, and time.   Skin: Skin is warm and dry.   Psychiatric: He has a normal mood and affect.       Significant Labs:     Recent Labs   Lab 03/23/20  0852   WBC 15.87*   RBC 3.23*   HGB 9.2*   HCT 28.1*      MCV 87   MCH 28.5   MCHC 32.7     Recent Labs   Lab 03/23/20  0852      K 4.3      CO2 22*   BUN 33*   CREATININE 1.8*

## 2020-03-23 NOTE — PLAN OF CARE
pt for discharge to home today -- no dme, no hh  [place] attempting to make apt for pt for f/u with [facility]  - extended wait time -- [place] faxed d/c summary to clinic  f .       pt has transportation to home   pt's nurse will review d/c meds/instructions     Follow-up With  Details  Why  Contact Info   Thomas  On 3/31/2020  11:15 am your discharge summary will be faxed to the office -- fax # [phone]   [address]  [facility] [address]  [phone]          03/23/20 1142   Final Note   Assessment Type Final Discharge Note   Anticipated Discharge Disposition Home   What phone number can be called within the next 1-3 days to see how you are doing after discharge? [phone]   Hospital Follow Up  Appt(s) scheduled?   (pt to contact [facility] for f/u apt - dc summ will be faxed to pt.  )   Discharge plans and expectations educations in teach back method with documentation complete? Yes   Right Care Referral Info   Post Acute Recommendation No Care   Referral Type   (no care )

## 2020-03-23 NOTE — PLAN OF CARE
Every 2 hour frequent check completed.  Lights out. Patient resting. No distress noted. Coughing noted but patient has no needs or complaints at this time.  All safety measures maintained.

## 2020-03-23 NOTE — PROGRESS NOTES
Antoine Ghosh #[id number] (CSN: [id number]) (64 y.o. M) (Adm: 03/19/20)   [facility] [id number]   PCP     DAUGHTERS OF ANMOL   Date of Birth     1955   Demographics     Address: Home Phone: Work Phone: Mobile Phone:     [address] [place] [address] [phone]      SSN: Insurance: Marital Status: Yazdanism:        [employer]    Admission Dx      Cough    Suspected Covid-19 Virus Infection   Chief Complaint     Complaint Comment   Back Pain c/o pain to the entire left side of his back since 0600. Also c/o mild cough for awhile.    Documents Filed to Patient     Power of  Living Will Clinical Unknown Study Attachment Consent Form ABN Waiver After Visit Summary Lab Result Scan Code Status Patient Portal Status   Not on File Not on File Not on File Not on File Not on File Not on File Not on File Not on File FULL [Updated on 03/19/20 2155] Code Exp   Auth/Cert Information      Open Auth/Cert for Hospital Account [id number]      Admission Information     Attending Provider Admitting Provider Admission Type Admission Date/Time   Pedrito Hawwest, DO Pedrito Hawawini, DO Emergency 03/19/20  1045   Discharge Date Hospital Service Auth/Cert Status Service Area    Hospital Medicine [facility]   Unit Room/Bed Admission Status    [facility] MEDICAL SURGICAL UNIT ACUTE K503/K503 A Admission (Confirmed)    Hospital Account     Name Acct ID Class Status Primary Coverage   Antoine Ghosh [id number] IP- Inpatient Open None          Guarantor Account (for Hospital Account #[id number])     Name Relation to Pt Service Area Active? Acct Type   Antoine Ghosh Self OHSSA Yes Personal/Family   Address Phone     [address] [place] [address] [phone](H)            Coverage Information (for Hospital Account #[id number])     Not on file          Emergency Contact Information     Name: Carly Ghoshsh Relationship: Son   Address: [address]:  [place] State: [place] Zip: [address] Phone: [phone]    Business phone: [phone]

## 2020-03-25 NOTE — PHYSICIAN QUERY
PT Name: Antoine Ghosh  MR #: [id number]    Physician Query Form -Documentation  Clarification     CDS: Quita Cohn RN, CCDS         Contact information :[phone]  [email or web address]     This form is a permanent document in the medical record.     Query Date: March 25, 2020     By submitting this query, we are merely seeking further clarification of documentation. Please utilize your independent clinical judgment when addressing the question(s) below.    The Medical record contains the following:     Indicators   Supporting Clinical Findings   Location in Medical Record   x HR RR BP Temp ,   RR 24,   T 102.4 VS 3/19/20   x Lactic Acid             Procalcitonin Procalcitonin 25.70 Lab 3/19/20   x WBC                Bands                     CRP WBC 21.55 Lab 3/19/20    Culture(s)      AMS, Confusion, LOC, etc.      Organ Dysfunction / Failure      Bacteremia or Sepsis / Septic     x Known or Suspected Source of Infection documented Cough  Could be related to viral syndrome    Suspected CAP H&P 3/19/20      Discharge summary 3/23/20    (Failed) Outpatient Treatment     x Medication On IV Rocephin daily with continued cough although improving.   Will treat with Levaquin 500mg po daily for 5 days for suspicion of CAP   Hospital Medicine PN 3/23/20    Discharge summary 3/23/20    Treatment     x Other WBCs 19K on 3/20/2020 and down furtehr to 15K this AM. COVID 19 negative. On IV Rocephin daily with continued cough although improving. Hopeful to discharge today on oral abx.    Hosp Med PN 3/23/20     Provider, please specify diagnosis or diagnoses associated with above clinical findings.  Please specify type of suspected CAP.        [    ]   Suspected Bacterial CAP (Community Acquired Pneumonia)       [ x   ]   Suspected Other type CAP (Community Acquired Pneumonia), please specify, ________       [    ]    Suspected unspecified CAP (Community Acquired Pneumonia)       [    ]    Other  clarification, ________     [    ]    Clinically Undetermined         Please document in your progress notes daily for the duration of treatment until resolved and include in your discharge summary.

## 2020-07-19 ENCOUNTER — OFFICE VISIT (OUTPATIENT)
Dept: URGENT CARE | Facility: CLINIC | Age: 65
End: 2020-07-19
Payer: MEDICARE

## 2020-07-19 VITALS
OXYGEN SATURATION: 98 % | DIASTOLIC BLOOD PRESSURE: 100 MMHG | SYSTOLIC BLOOD PRESSURE: 169 MMHG | RESPIRATION RATE: 19 BRPM | HEART RATE: 81 BPM | TEMPERATURE: 98 F | BODY MASS INDEX: 28.04 KG/M2 | WEIGHT: 185 LBS | HEIGHT: 68 IN

## 2020-07-19 DIAGNOSIS — M10.9 ACUTE GOUT OF LEFT HAND, UNSPECIFIED CAUSE: Primary | ICD-10-CM

## 2020-07-19 DIAGNOSIS — R20.8 OTHER DISTURBANCES OF SKIN SENSATION: ICD-10-CM

## 2020-07-19 LAB — GLUCOSE SERPL-MCNC: 294 MG/DL (ref 70–110)

## 2020-07-19 PROCEDURE — 99203 PR OFFICE/OUTPT VISIT, NEW, LEVL III, 30-44 MIN: ICD-10-PCS | Mod: 25,S$GLB,, | Performed by: NURSE PRACTITIONER

## 2020-07-19 PROCEDURE — 82962 POCT GLUCOSE, HAND-HELD DEVICE: ICD-10-PCS | Mod: S$GLB,,, | Performed by: NURSE PRACTITIONER

## 2020-07-19 PROCEDURE — 99203 OFFICE O/P NEW LOW 30 MIN: CPT | Mod: 25,S$GLB,, | Performed by: NURSE PRACTITIONER

## 2020-07-19 PROCEDURE — 96372 THER/PROPH/DIAG INJ SC/IM: CPT | Mod: S$GLB,,, | Performed by: NURSE PRACTITIONER

## 2020-07-19 PROCEDURE — 96372 PR INJECTION,THERAP/PROPH/DIAG2ST, IM OR SUBCUT: ICD-10-PCS | Mod: S$GLB,,, | Performed by: NURSE PRACTITIONER

## 2020-07-19 PROCEDURE — 82962 GLUCOSE BLOOD TEST: CPT | Mod: S$GLB,,, | Performed by: NURSE PRACTITIONER

## 2020-07-19 RX ORDER — COLCHICINE 0.6 MG/1
0.6 TABLET ORAL 2 TIMES DAILY
Qty: 30 TABLET | Refills: 0 | Status: SHIPPED | OUTPATIENT
Start: 2020-07-19 | End: 2020-08-03

## 2020-07-19 RX ORDER — KETOROLAC TROMETHAMINE 30 MG/ML
30 INJECTION, SOLUTION INTRAMUSCULAR; INTRAVENOUS
Status: COMPLETED | OUTPATIENT
Start: 2020-07-19 | End: 2020-07-19

## 2020-07-19 RX ADMIN — KETOROLAC TROMETHAMINE 30 MG: 30 INJECTION, SOLUTION INTRAMUSCULAR; INTRAVENOUS at 11:07

## 2020-07-19 NOTE — PATIENT INSTRUCTIONS
COLCHICINE DIRECTIONS: Day 1: Oral: 1.2 mg (2 TABLETS) at the first sign of flare, followed in 1 hour with a single dose of 0.6 mg     Oral: 0.6 mg once or twice daily until flare resolves           Gout Diet  Gout is a painful condition caused by an excess of uric acid, a waste product made by the body. Uric acid forms crystals that collect in the joints. The immune response to these crystals brings on symptoms of joint pain and swelling. This is called a gout attack. Often, medications and diet changes are combined to manage gout. Below are some guidelines for changing your diet to help you manage gout and prevent attacks. Your health care provider will help you determine the best eating plan for you.     Eating to manage gout  Weight loss for those who are overweight may help reduce gout attacks.  Eat less of these foods  Eating too many foods containing purines may raise the levels of uric acid in your body. This raises your risk for a gout attack. Try to limit these foods and drinks:  · Alcohol, such as beer and red wine. You may be told to avoid alcohol completely.  · Soft drinks that contain sugar or high fructose corn syrup  · Certain fish, including anchovies, sardines, fish eggs, and herring  · Shellfish  · Certain meats, such as red meat, hot dogs, luncheon meats, and turkey  · Organ meats, such as liver, kidneys, and sweetbreads  · Legumes, such as dried beans and peas  · Other high fat foods such as gravy, whole milk, and high fat cheeses  · Vegetables such as asparagus, cauliflower, spinach, and mushrooms used to be thought to contribute to an increased risk for a gout attack, but recent studies show that high purine vegetables don't increase the risk for a gout attack.  Eat more of these foods  Other foods may be helpful for people with gout. Add some of these foods to your diet:  · Cherries contain chemicals that may lower uric acid.  · Omega fatty acids. These are found in some fatty fish such as  salmon, certain oils (flax, olive, or nut), and nuts themselves. Omega fatty acids may help prevent inflammation due to gout.  · Dairy products that are low-fat or fat-free, such as cheese and yogurt  · Complex carbohydrate foods, including whole grains, brown rice, oats, and beans  · Coffee, in moderation  · Water, approximately 64 ounces per day  Follow-up care  Follow up with your healthcare provider as advised.  When to seek medical advice  Call your healthcare provider right away if any of these occur:  · Return of gout symptoms, usually at night:  · Severe pain, swelling, and heat in a joint, especially the base of the big toe  · Affected joint is hard to move  · Skin of the affected joint is purple or red  · Fever of 100.4°F (38°C) or higher  · Pain that doesn't get better even with prescribed medicine   Date Last Reviewed: 1/12/2016  © 6368-0885 [employer]. [address] [place] [address], [place] [address]. All rights reserved. This information is not intended as a substitute for professional medical care. Always follow your healthcare professional's instructions.

## 2020-07-19 NOTE — PROGRESS NOTES
"Subjective:       Patient ID: Antoine Ghosh is a 65 y.o. male.    Vitals:  height is 5' 8" (1.727 m) and weight is 83.9 kg (185 lb). His temperature is 98.2 °F (36.8 °C). His blood pressure is 169/100 (abnormal) and his pulse is 81. His respiration is 19 and oxygen saturation is 98%.     Chief Complaint: Gout (left hand)    This is a 65-year-old male with a history of gout and diabetes who presents today with complaints of left hand pain and left wrist pain.  Patient states it is similar to gout attacks in the past.  He denies any trauma.  Denies any numbness, tingling or weakness.    Edema  This is a new problem. The current episode started 1 to 4 weeks ago (a week ). The problem occurs constantly. The problem has been unchanged. Pertinent negatives include no abdominal pain, anorexia, arthralgias, change in bowel habit, chest pain, chills, congestion, coughing, diaphoresis, fatigue, fever, headaches, joint swelling, myalgias, nausea, neck pain, numbness, rash, sore throat, swollen glands, urinary symptoms, vertigo, visual change, vomiting or weakness. Associated symptoms comments: Left wrist, index finger, and shoulder. The symptoms are aggravated by bending (movement). He has tried nothing for the symptoms. The treatment provided no relief.       Constitution: Negative for chills, sweating, fatigue and fever.   HENT: Negative for congestion and sore throat.    Neck: Negative for neck pain and painful lymph nodes.   Cardiovascular: Negative for chest pain and leg swelling.   Eyes: Negative for double vision and blurred vision.   Respiratory: Negative for cough and shortness of breath.    Gastrointestinal: Negative for abdominal pain, nausea, vomiting and diarrhea.   Genitourinary: Negative for dysuria, frequency and urgency.   Musculoskeletal: Negative for joint pain, joint swelling, muscle cramps and muscle ache.   Skin: Negative for color change, pale, rash and erythema.   Allergic/Immunologic: Negative " for seasonal allergies.   Neurological: Negative for dizziness, history of vertigo, light-headedness, passing out, headaches and numbness.   Hematologic/Lymphatic: Negative for swollen lymph nodes, easy bruising/bleeding and history of blood clots. Does not bruise/bleed easily.   Psychiatric/Behavioral: Negative for nervous/anxious, sleep disturbance and depression. The patient is not nervous/anxious.        Objective:      Physical Exam   Constitutional: He is oriented to person, place, and time. He appears well-developed.   HENT:   Head: Normocephalic and atraumatic. Head is without abrasion, without contusion and without laceration.   Ears:   Right Ear: External ear normal.   Left Ear: External ear normal.   Nose: Nose normal.   Mouth/Throat: Oropharynx is clear and moist and mucous membranes are normal.   Eyes: Pupils are equal, round, and reactive to light. Conjunctivae, EOM and lids are normal.   Neck: Trachea normal, full passive range of motion without pain and phonation normal. Neck supple.   Cardiovascular: Normal rate, regular rhythm and normal heart sounds.   Pulmonary/Chest: Effort normal and breath sounds normal. No stridor. No respiratory distress.   Musculoskeletal: Normal range of motion.        Hands:    Neurological: He is alert and oriented to person, place, and time.   Skin: Skin is warm, dry, intact and no rash. Capillary refill takes less than 2 seconds. abrasion, burn, bruising, erythema and ecchymosisPsychiatric: His speech is normal and behavior is normal. Judgment and thought content normal.   Nursing note and vitals reviewed.        Results for orders placed or performed in visit on 07/19/20   POCT Glucose, Hand-Held Device   Result Value Ref Range    POC Glucose 294 (A) 70 - 110 MG/DL     Assessment:       1. Acute gout of left hand, unspecified cause    2. Other disturbances of skin sensation         Plan:         Acute gout of left hand, unspecified cause  -     POCT Glucose, Hand-Held  Device  -     colchicine (COLCRYS) 0.6 mg tablet; Take 1 tablet (0.6 mg total) by mouth 2 (two) times daily. for 15 days  Dispense: 30 tablet; Refill: 0  -     ketorolac injection 30 mg    Other disturbances of skin sensation   -     POCT Glucose, Hand-Held Device             Patient Instructions   COLCHICINE DIRECTIONS: Day 1: Oral: 1.2 mg (2 TABLETS) at the first sign of flare, followed in 1 hour with a single dose of 0.6 mg     Oral: 0.6 mg once or twice daily until flare resolves           Gout Diet  Gout is a painful condition caused by an excess of uric acid, a waste product made by the body. Uric acid forms crystals that collect in the joints. The immune response to these crystals brings on symptoms of joint pain and swelling. This is called a gout attack. Often, medications and diet changes are combined to manage gout. Below are some guidelines for changing your diet to help you manage gout and prevent attacks. Your health care provider will help you determine the best eating plan for you.     Eating to manage gout  Weight loss for those who are overweight may help reduce gout attacks.  Eat less of these foods  Eating too many foods containing purines may raise the levels of uric acid in your body. This raises your risk for a gout attack. Try to limit these foods and drinks:  · Alcohol, such as beer and red wine. You may be told to avoid alcohol completely.  · Soft drinks that contain sugar or high fructose corn syrup  · Certain fish, including anchovies, sardines, fish eggs, and herring  · Shellfish  · Certain meats, such as red meat, hot dogs, luncheon meats, and turkey  · Organ meats, such as liver, kidneys, and sweetbreads  · Legumes, such as dried beans and peas  · Other high fat foods such as gravy, whole milk, and high fat cheeses  · Vegetables such as asparagus, cauliflower, spinach, and mushrooms used to be thought to contribute to an increased risk for a gout attack, but recent studies show that  high purine vegetables don't increase the risk for a gout attack.  Eat more of these foods  Other foods may be helpful for people with gout. Add some of these foods to your diet:  · Cherries contain chemicals that may lower uric acid.  · Omega fatty acids. These are found in some fatty fish such as salmon, certain oils (flax, olive, or nut), and nuts themselves. Omega fatty acids may help prevent inflammation due to gout.  · Dairy products that are low-fat or fat-free, such as cheese and yogurt  · Complex carbohydrate foods, including whole grains, brown rice, oats, and beans  · Coffee, in moderation  · Water, approximately 64 ounces per day  Follow-up care  Follow up with your healthcare provider as advised.  When to seek medical advice  Call your healthcare provider right away if any of these occur:  · Return of gout symptoms, usually at night:  · Severe pain, swelling, and heat in a joint, especially the base of the big toe  · Affected joint is hard to move  · Skin of the affected joint is purple or red  · Fever of 100.4°F (38°C) or higher  · Pain that doesn't get better even with prescribed medicine   Date Last Reviewed: 1/12/2016  © 2575-4295 [employer]. [address] [place] [address], [place] [address]. All rights reserved. This information is not intended as a substitute for professional medical care. Always follow your healthcare professional's instructions.

## 2021-03-11 ENCOUNTER — OFFICE VISIT (OUTPATIENT)
Dept: URGENT CARE | Facility: CLINIC | Age: 66
End: 2021-03-11
Payer: MEDICARE

## 2021-03-11 VITALS
RESPIRATION RATE: 16 BRPM | WEIGHT: 187 LBS | OXYGEN SATURATION: 97 % | HEIGHT: 68 IN | HEART RATE: 80 BPM | TEMPERATURE: 98 F | BODY MASS INDEX: 28.34 KG/M2 | SYSTOLIC BLOOD PRESSURE: 183 MMHG | DIASTOLIC BLOOD PRESSURE: 95 MMHG

## 2021-03-11 DIAGNOSIS — M10.9 ACUTE GOUT, UNSPECIFIED CAUSE, UNSPECIFIED SITE: Primary | ICD-10-CM

## 2021-03-11 PROCEDURE — 3008F BODY MASS INDEX DOCD: CPT | Mod: CPTII,S$GLB,, | Performed by: NURSE PRACTITIONER

## 2021-03-11 PROCEDURE — 96372 THER/PROPH/DIAG INJ SC/IM: CPT | Mod: S$GLB,,, | Performed by: FAMILY MEDICINE

## 2021-03-11 PROCEDURE — 99213 PR OFFICE/OUTPT VISIT, EST, LEVL III, 20-29 MIN: ICD-10-PCS | Mod: 25,S$GLB,, | Performed by: NURSE PRACTITIONER

## 2021-03-11 PROCEDURE — 3008F PR BODY MASS INDEX (BMI) DOCUMENTED: ICD-10-PCS | Mod: CPTII,S$GLB,, | Performed by: NURSE PRACTITIONER

## 2021-03-11 PROCEDURE — 99213 OFFICE O/P EST LOW 20 MIN: CPT | Mod: 25,S$GLB,, | Performed by: NURSE PRACTITIONER

## 2021-03-11 PROCEDURE — 96372 PR INJECTION,THERAP/PROPH/DIAG2ST, IM OR SUBCUT: ICD-10-PCS | Mod: S$GLB,,, | Performed by: FAMILY MEDICINE

## 2021-03-11 RX ORDER — COLCHICINE 0.6 MG/1
0.6 TABLET ORAL DAILY
Qty: 30 TABLET | Refills: 0 | Status: SHIPPED | OUTPATIENT
Start: 2021-03-11 | End: 2021-07-13 | Stop reason: ALTCHOICE

## 2021-03-11 RX ORDER — KETOROLAC TROMETHAMINE 30 MG/ML
30 INJECTION, SOLUTION INTRAMUSCULAR; INTRAVENOUS
Status: COMPLETED | OUTPATIENT
Start: 2021-03-11 | End: 2021-03-11

## 2021-03-11 RX ADMIN — KETOROLAC TROMETHAMINE 30 MG: 30 INJECTION, SOLUTION INTRAMUSCULAR; INTRAVENOUS at 03:03

## 2021-07-13 ENCOUNTER — OFFICE VISIT (OUTPATIENT)
Dept: URGENT CARE | Facility: CLINIC | Age: 66
End: 2021-07-13
Payer: MEDICARE

## 2021-07-13 VITALS
HEIGHT: 68 IN | TEMPERATURE: 99 F | WEIGHT: 185 LBS | BODY MASS INDEX: 28.04 KG/M2 | RESPIRATION RATE: 16 BRPM | DIASTOLIC BLOOD PRESSURE: 89 MMHG | SYSTOLIC BLOOD PRESSURE: 164 MMHG | HEART RATE: 72 BPM | OXYGEN SATURATION: 97 %

## 2021-07-13 DIAGNOSIS — E11.9 TYPE 2 DIABETES MELLITUS WITHOUT COMPLICATION, WITH LONG-TERM CURRENT USE OF INSULIN: ICD-10-CM

## 2021-07-13 DIAGNOSIS — M10.9 ACUTE GOUT OF LEFT WRIST, UNSPECIFIED CAUSE: Primary | ICD-10-CM

## 2021-07-13 DIAGNOSIS — Z79.4 TYPE 2 DIABETES MELLITUS WITHOUT COMPLICATION, WITH LONG-TERM CURRENT USE OF INSULIN: ICD-10-CM

## 2021-07-13 LAB — GLUCOSE SERPL-MCNC: 183 MG/DL (ref 70–110)

## 2021-07-13 PROCEDURE — 3008F BODY MASS INDEX DOCD: CPT | Mod: CPTII,S$GLB,, | Performed by: STUDENT IN AN ORGANIZED HEALTH CARE EDUCATION/TRAINING PROGRAM

## 2021-07-13 PROCEDURE — 99214 OFFICE O/P EST MOD 30 MIN: CPT | Mod: S$GLB,,, | Performed by: STUDENT IN AN ORGANIZED HEALTH CARE EDUCATION/TRAINING PROGRAM

## 2021-07-13 PROCEDURE — 3008F PR BODY MASS INDEX (BMI) DOCUMENTED: ICD-10-PCS | Mod: CPTII,S$GLB,, | Performed by: STUDENT IN AN ORGANIZED HEALTH CARE EDUCATION/TRAINING PROGRAM

## 2021-07-13 PROCEDURE — 99214 PR OFFICE/OUTPT VISIT, EST, LEVL IV, 30-39 MIN: ICD-10-PCS | Mod: S$GLB,,, | Performed by: STUDENT IN AN ORGANIZED HEALTH CARE EDUCATION/TRAINING PROGRAM

## 2021-07-13 PROCEDURE — 82962 POCT GLUCOSE, HAND-HELD DEVICE: ICD-10-PCS | Mod: S$GLB,,, | Performed by: STUDENT IN AN ORGANIZED HEALTH CARE EDUCATION/TRAINING PROGRAM

## 2021-07-13 PROCEDURE — 82962 GLUCOSE BLOOD TEST: CPT | Mod: S$GLB,,, | Performed by: STUDENT IN AN ORGANIZED HEALTH CARE EDUCATION/TRAINING PROGRAM

## 2021-07-13 RX ORDER — COLCHICINE 0.6 MG/1
TABLET ORAL
Qty: 3 TABLET | Refills: 0 | Status: SHIPPED | OUTPATIENT
Start: 2021-07-13 | End: 2023-01-25 | Stop reason: SDUPTHER

## 2021-07-13 RX ORDER — DEXAMETHASONE SODIUM PHOSPHATE 100 MG/10ML
10 INJECTION INTRAMUSCULAR; INTRAVENOUS
Status: DISCONTINUED | OUTPATIENT
Start: 2021-07-13 | End: 2021-07-13

## 2022-10-14 ENCOUNTER — OFFICE VISIT (OUTPATIENT)
Dept: URGENT CARE | Facility: CLINIC | Age: 67
End: 2022-10-14
Payer: MEDICARE

## 2022-10-14 VITALS
DIASTOLIC BLOOD PRESSURE: 101 MMHG | BODY MASS INDEX: 28.04 KG/M2 | WEIGHT: 185 LBS | HEIGHT: 68 IN | RESPIRATION RATE: 20 BRPM | SYSTOLIC BLOOD PRESSURE: 169 MMHG | TEMPERATURE: 99 F | HEART RATE: 74 BPM | OXYGEN SATURATION: 96 %

## 2022-10-14 DIAGNOSIS — M10.09 ACUTE IDIOPATHIC GOUT OF MULTIPLE SITES: Primary | ICD-10-CM

## 2022-10-14 PROCEDURE — 96372 THER/PROPH/DIAG INJ SC/IM: CPT | Mod: S$GLB,,, | Performed by: NURSE PRACTITIONER

## 2022-10-14 PROCEDURE — 3077F SYST BP >= 140 MM HG: CPT | Mod: CPTII,S$GLB,, | Performed by: NURSE PRACTITIONER

## 2022-10-14 PROCEDURE — 99213 OFFICE O/P EST LOW 20 MIN: CPT | Mod: 25,S$GLB,, | Performed by: NURSE PRACTITIONER

## 2022-10-14 PROCEDURE — 96372 PR INJECTION,THERAP/PROPH/DIAG2ST, IM OR SUBCUT: ICD-10-PCS | Mod: S$GLB,,, | Performed by: NURSE PRACTITIONER

## 2022-10-14 PROCEDURE — 1160F RVW MEDS BY RX/DR IN RCRD: CPT | Mod: CPTII,S$GLB,, | Performed by: NURSE PRACTITIONER

## 2022-10-14 PROCEDURE — 3080F PR MOST RECENT DIASTOLIC BLOOD PRESSURE >= 90 MM HG: ICD-10-PCS | Mod: CPTII,S$GLB,, | Performed by: NURSE PRACTITIONER

## 2022-10-14 PROCEDURE — 3077F PR MOST RECENT SYSTOLIC BLOOD PRESSURE >= 140 MM HG: ICD-10-PCS | Mod: CPTII,S$GLB,, | Performed by: NURSE PRACTITIONER

## 2022-10-14 PROCEDURE — 1159F MED LIST DOCD IN RCRD: CPT | Mod: CPTII,S$GLB,, | Performed by: NURSE PRACTITIONER

## 2022-10-14 PROCEDURE — 3080F DIAST BP >= 90 MM HG: CPT | Mod: CPTII,S$GLB,, | Performed by: NURSE PRACTITIONER

## 2022-10-14 PROCEDURE — 1125F AMNT PAIN NOTED PAIN PRSNT: CPT | Mod: CPTII,S$GLB,, | Performed by: NURSE PRACTITIONER

## 2022-10-14 PROCEDURE — 1160F PR REVIEW ALL MEDS BY PRESCRIBER/CLIN PHARMACIST DOCUMENTED: ICD-10-PCS | Mod: CPTII,S$GLB,, | Performed by: NURSE PRACTITIONER

## 2022-10-14 PROCEDURE — 1159F PR MEDICATION LIST DOCUMENTED IN MEDICAL RECORD: ICD-10-PCS | Mod: CPTII,S$GLB,, | Performed by: NURSE PRACTITIONER

## 2022-10-14 PROCEDURE — 99213 PR OFFICE/OUTPT VISIT, EST, LEVL III, 20-29 MIN: ICD-10-PCS | Mod: 25,S$GLB,, | Performed by: NURSE PRACTITIONER

## 2022-10-14 PROCEDURE — 1125F PR PAIN SEVERITY QUANTIFIED, PAIN PRESENT: ICD-10-PCS | Mod: CPTII,S$GLB,, | Performed by: NURSE PRACTITIONER

## 2022-10-14 RX ORDER — INDOMETHACIN 50 MG/1
50 CAPSULE ORAL
Qty: 21 CAPSULE | Refills: 0 | Status: SHIPPED | OUTPATIENT
Start: 2022-10-14 | End: 2022-10-21

## 2022-10-14 RX ORDER — BETAMETHASONE SODIUM PHOSPHATE AND BETAMETHASONE ACETATE 3; 3 MG/ML; MG/ML
6 INJECTION, SUSPENSION INTRA-ARTICULAR; INTRALESIONAL; INTRAMUSCULAR; SOFT TISSUE
Status: COMPLETED | OUTPATIENT
Start: 2022-10-14 | End: 2022-10-14

## 2022-10-14 RX ADMIN — BETAMETHASONE SODIUM PHOSPHATE AND BETAMETHASONE ACETATE 6 MG: 3; 3 INJECTION, SUSPENSION INTRA-ARTICULAR; INTRALESIONAL; INTRAMUSCULAR; SOFT TISSUE at 04:10

## 2022-10-14 NOTE — PROGRESS NOTES
"Subjective:       Patient ID: Antoine Ghosh is a 67 y.o. male.    Vitals:  height is 5' 8" (1.727 m) and weight is 83.9 kg (185 lb). His temperature is 99.4 °F (37.4 °C). His blood pressure is 169/101 (abnormal) and his pulse is 74. His respiration is 20 and oxygen saturation is 96%.     Chief Complaint: Knee Pain    This is a 67 y.o. male who presents today with a chief complaint of left knee pain and swelling, right wrist swelling, and both joints in shoulders hurt when moving that has been going on for 2 weeks. Pt states that he went to his doctor and got prescribed Colchicine 0.6mg. Pt has been taking that medicine for two weeks but his symptoms have not improved.     Provider note begins below:    Patient requests that daughter be present to help with translation.    Patient with long-term chronic history of gout of multiple locations.  Patient treated for gout with colchicine which has been ineffective.  He request his standard treatment of injectable steroid and anti-inflammatory.    He denies systemic fever.  There is notable swelling to the right wrist but no erythema or fever.  The affected joints, right wrist, left knee, right and left shoulders, are tender to the touch, but, the only joint with swelling is the right wrist.    Knee Pain   The incident occurred more than 1 week ago. There was no injury mechanism. The pain is present in the left knee (right wrist, both shoulders). The pain is at a severity of 10/10. The pain is severe. Associated symptoms include an inability to bear weight. He reports no foreign bodies present. The symptoms are aggravated by movement and weight bearing. Treatments tried: Colchicine 0.6 mg.     Skin:  Negative for erythema.     Objective:      Physical Exam   Constitutional: He is oriented to person, place, and time. He appears well-developed. No distress.      Comments:Appears uncomfortable     HENT:   Head: Normocephalic and atraumatic. Head is without abrasion, " without contusion and without laceration.   Ears:   Right Ear: External ear normal.   Left Ear: External ear normal.   Nose: Nose normal.   Mouth/Throat: Oropharynx is clear and moist and mucous membranes are normal.   Eyes: Conjunctivae, EOM and lids are normal. Pupils are equal, round, and reactive to light.   Neck: Trachea normal and phonation normal. Neck supple.   Cardiovascular: Normal rate, regular rhythm and normal heart sounds.   Pulmonary/Chest: Effort normal and breath sounds normal. No stridor. No respiratory distress.   Musculoskeletal: Normal range of motion.         General: Normal range of motion.   Neurological: He is alert and oriented to person, place, and time.   Skin: Skin is warm, dry, intact and no rash. Capillary refill takes less than 2 seconds. No abrasion, No burn, No bruising, No erythema and No ecchymosis        Psychiatric: His speech is normal and behavior is normal. Judgment and thought content normal.   Nursing note and vitals reviewed.      Assessment:       1. Acute idiopathic gout of multiple sites          Plan:       I discussed with the patient and his daughter that if his symptoms are not improving to schedule follow-up with his primary care provider.  I further discussed that if his symptoms worsen or if he notices spreading areas of redness or increasing levels of pain to immediately follow up here his primary care provider or the emergency department.    Acute idiopathic gout of multiple sites  -     betamethasone acetate-betamethasone sodium phosphate injection 6 mg  -     indomethacin (INDOCIN) 50 MG capsule; Take 1 capsule (50 mg total) by mouth 3 (three) times daily with meals. for 7 days  Dispense: 21 capsule; Refill: 0

## 2022-10-14 NOTE — PATIENT INSTRUCTIONS
Please discontinue the colchicine medication as it has not been effective.    You may take the prescribed indomethacin 3 times per day as prescribed with a small meal.  You may discontinue the medication once her pain is resolved.

## 2023-01-25 ENCOUNTER — OFFICE VISIT (OUTPATIENT)
Dept: URGENT CARE | Facility: CLINIC | Age: 68
End: 2023-01-25
Payer: MEDICARE

## 2023-01-25 VITALS
OXYGEN SATURATION: 96 % | WEIGHT: 184.94 LBS | HEIGHT: 68 IN | HEART RATE: 85 BPM | TEMPERATURE: 98 F | SYSTOLIC BLOOD PRESSURE: 170 MMHG | RESPIRATION RATE: 16 BRPM | DIASTOLIC BLOOD PRESSURE: 90 MMHG | BODY MASS INDEX: 28.03 KG/M2

## 2023-01-25 DIAGNOSIS — M10.9 ACUTE GOUT OF MULTIPLE SITES, UNSPECIFIED CAUSE: Primary | ICD-10-CM

## 2023-01-25 DIAGNOSIS — E11.8 TYPE 2 DIABETES MELLITUS WITH UNSPECIFIED COMPLICATIONS: ICD-10-CM

## 2023-01-25 LAB — GLUCOSE SERPL-MCNC: 217 MG/DL (ref 70–110)

## 2023-01-25 PROCEDURE — 82962 GLUCOSE BLOOD TEST: CPT | Mod: S$GLB,,,

## 2023-01-25 PROCEDURE — 1159F PR MEDICATION LIST DOCUMENTED IN MEDICAL RECORD: ICD-10-PCS | Mod: CPTII,S$GLB,,

## 2023-01-25 PROCEDURE — 1160F PR REVIEW ALL MEDS BY PRESCRIBER/CLIN PHARMACIST DOCUMENTED: ICD-10-PCS | Mod: CPTII,S$GLB,,

## 2023-01-25 PROCEDURE — 82962 POCT GLUCOSE, HAND-HELD DEVICE: ICD-10-PCS | Mod: S$GLB,,,

## 2023-01-25 PROCEDURE — 96372 PR INJECTION,THERAP/PROPH/DIAG2ST, IM OR SUBCUT: ICD-10-PCS | Mod: S$GLB,,,

## 2023-01-25 PROCEDURE — 96372 THER/PROPH/DIAG INJ SC/IM: CPT | Mod: S$GLB,,,

## 2023-01-25 PROCEDURE — 3008F BODY MASS INDEX DOCD: CPT | Mod: CPTII,S$GLB,,

## 2023-01-25 PROCEDURE — 99213 PR OFFICE/OUTPT VISIT, EST, LEVL III, 20-29 MIN: ICD-10-PCS | Mod: 25,S$GLB,,

## 2023-01-25 PROCEDURE — 3077F PR MOST RECENT SYSTOLIC BLOOD PRESSURE >= 140 MM HG: ICD-10-PCS | Mod: CPTII,S$GLB,,

## 2023-01-25 PROCEDURE — 1125F PR PAIN SEVERITY QUANTIFIED, PAIN PRESENT: ICD-10-PCS | Mod: CPTII,S$GLB,,

## 2023-01-25 PROCEDURE — 3077F SYST BP >= 140 MM HG: CPT | Mod: CPTII,S$GLB,,

## 2023-01-25 PROCEDURE — 3080F PR MOST RECENT DIASTOLIC BLOOD PRESSURE >= 90 MM HG: ICD-10-PCS | Mod: CPTII,S$GLB,,

## 2023-01-25 PROCEDURE — 3008F PR BODY MASS INDEX (BMI) DOCUMENTED: ICD-10-PCS | Mod: CPTII,S$GLB,,

## 2023-01-25 PROCEDURE — 1160F RVW MEDS BY RX/DR IN RCRD: CPT | Mod: CPTII,S$GLB,,

## 2023-01-25 PROCEDURE — 3080F DIAST BP >= 90 MM HG: CPT | Mod: CPTII,S$GLB,,

## 2023-01-25 PROCEDURE — 99213 OFFICE O/P EST LOW 20 MIN: CPT | Mod: 25,S$GLB,,

## 2023-01-25 PROCEDURE — 1159F MED LIST DOCD IN RCRD: CPT | Mod: CPTII,S$GLB,,

## 2023-01-25 PROCEDURE — 1125F AMNT PAIN NOTED PAIN PRSNT: CPT | Mod: CPTII,S$GLB,,

## 2023-01-25 RX ORDER — KETOROLAC TROMETHAMINE 30 MG/ML
30 INJECTION, SOLUTION INTRAMUSCULAR; INTRAVENOUS
Status: COMPLETED | OUTPATIENT
Start: 2023-01-25 | End: 2023-01-25

## 2023-01-25 RX ORDER — COLCHICINE 0.6 MG/1
TABLET ORAL
Qty: 3 TABLET | Refills: 0 | Status: SHIPPED | OUTPATIENT
Start: 2023-01-25

## 2023-01-25 RX ORDER — LIDOCAINE 50 MG/G
1 PATCH TOPICAL DAILY
Qty: 15 PATCH | Refills: 0 | Status: SHIPPED | OUTPATIENT
Start: 2023-01-25 | End: 2023-02-09

## 2023-01-25 RX ORDER — DICLOFENAC SODIUM 10 MG/G
2 GEL TOPICAL 4 TIMES DAILY
Qty: 20 G | Refills: 0 | Status: SHIPPED | OUTPATIENT
Start: 2023-01-25

## 2023-01-25 RX ADMIN — KETOROLAC TROMETHAMINE 30 MG: 30 INJECTION, SOLUTION INTRAMUSCULAR; INTRAVENOUS at 12:01

## 2023-01-25 NOTE — PATIENT INSTRUCTIONS
Please return here or go to the Emergency Department for any concerns or worsening of condition.    Please apply VOLTAREN GEL to the affected areas for pain relief.  Please apply LIDOCAINE PATCH to the affected areas for pain relief.   Please take COLCHICINE as directed to help with gout.     Please make an appointment with your Primary Care Provider for you may need to be started on another medication for long term management of your gout.     Please be advised that you were NOT given a steroid injection due to your history of diabetes and elevated glucose levels.     Please follow up with your primary care doctor or specialist as needed.    If you  smoke, please stop smoking.

## 2023-01-25 NOTE — PROGRESS NOTES
"Subjective:       Patient ID: Antoine Ghosh is a 67 y.o. male.    Vitals:  height is 5' 8" (1.727 m) and weight is 83.9 kg (184 lb 15.5 oz). His oral temperature is 97.9 °F (36.6 °C). His blood pressure is 170/90 (abnormal) and his pulse is 85. His respiration is 16 and oxygen saturation is 96%.     Chief Complaint: Knee Pain    Patient is present with his daughter who is translating for him. Patient is able to understand some english. Patient states that he has been having left knee, right/left wrist, and left ankle pain that started 3 days ago. Daughter states that he has a history of gout and that the gout is always in the knees, wrists, and ankles. Patient states that this feels like his typical gout flare ups. Patient states that he has not been taking his Colchicine medication for his gout. He states that indomethacin helps with his flare ups.      Knee Pain   The incident occurred 3 to 5 days ago. The incident occurred at home. There was no injury mechanism. The pain is present in the left knee. The quality of the pain is described as aching. The pain is at a severity of 10/10. The pain is severe. The pain has been Constant since onset. Associated symptoms include numbness and tingling. Pertinent negatives include no inability to bear weight, loss of motion or loss of sensation. He reports no foreign bodies present. The symptoms are aggravated by movement. He has tried nothing for the symptoms. The treatment provided no relief.     Constitution: Negative for chills and fever.   Cardiovascular:  Negative for chest pain and sob on exertion.   Respiratory:  Negative for shortness of breath.    Gastrointestinal:  Negative for nausea, vomiting and diarrhea.   Musculoskeletal:  Positive for pain, joint pain, abnormal ROM of joint (left knee due to pain), gout, pain with walking (left knee) and muscle ache. Negative for joint swelling, back pain and muscle cramps.   Neurological:  Positive for numbness.   "   Objective:      Physical Exam   Constitutional:  Non-toxic appearance. He does not appear ill. No distress. normal  Abdominal: Normal appearance.   Musculoskeletal:         General: Swelling (mild swelling appreciated on the medial aspect of the left knee), tenderness and deformity (predominant tuberosities appreciated near the tibial tuberocity bilaterally) present. No signs of injury.      Right wrist: He exhibits swelling. He exhibits normal range of motion, no tenderness, no bony tenderness, no effusion, no deformity and no laceration.      Left wrist: He exhibits normal range of motion, no tenderness, no bony tenderness, no swelling, no effusion, no deformity and no laceration.      Right knee: He exhibits decreased range of motion (due to pain), swelling and deformity. He exhibits no effusion, no ecchymosis, no laceration, no erythema and no bony tenderness. Tenderness (medial aspect) found.      Left knee: He exhibits deformity. He exhibits normal range of motion, no swelling, no effusion, no ecchymosis, no laceration, no erythema and no bony tenderness. No tenderness found.        Arms:       Right lower leg: No edema.      Left lower leg: No edema.        Legs:    Neurological: He is alert.   Skin: Skin is not diaphoretic. not left knee and not right knee  Nursing note and vitals reviewed.      Results for orders placed or performed in visit on 01/25/23   POCT Glucose, Hand-Held Device   Result Value Ref Range    POC Glucose 217 (A) 70 - 110 MG/DL     Assessment:       1. Acute gout of multiple sites, unspecified cause    2. Type 2 diabetes mellitus with unspecified complications          Plan:    Previous notes reviewed.  Vital signs reviewed. /90.  Labs ordered. Labs reviewed. Glucose 217.  Discussed Acute gout flare up, home care, tx options, and given follow up precautions.  Patient was briefed on my thought process and diagnosis.   Patient involved with the treatment plan and agreed to the  plan.  Patient informed on warning signs, patient understood warning signs and to go to urgent care or ER if warning signs appear.  Patient and daughter explained that due to his elevated blood glucose that a steroid injection is not advised due to the potential of life threatening complications.   Both the patient and daughter understood.  Risk and benefits of a Toradol injection based off the patient's last BMP was discussed with the patient and his daughter, they would like to receive the Toradol injection.   Patient and daughter informed that they need to make an appointment with the PCP within the week for alterations to the his gout management.    Patient Instructions   Please return here or go to the Emergency Department for any concerns or worsening of condition.    Please apply VOLTAREN GEL to the affected areas for pain relief.  Please apply LIDOCAINE PATCH to the affected areas for pain relief.   Please take COLCHICINE as directed to help with gout.     Please make an appointment with your Primary Care Provider for you may need to be started on another medication for long term management of your gout.     Please be advised that you were NOT given a steroid injection due to your history of diabetes and elevated glucose levels.     Please follow up with your primary care doctor or specialist as needed.    If you  smoke, please stop smoking.    Acute gout of multiple sites, unspecified cause  -     POCT Glucose, Hand-Held Device    Type 2 diabetes mellitus with unspecified complications  -     POCT Glucose, Hand-Held Device      Michelle Alonso PA-C

## 2023-03-29 ENCOUNTER — OFFICE VISIT (OUTPATIENT)
Dept: URGENT CARE | Facility: CLINIC | Age: 68
End: 2023-03-29
Payer: MEDICARE

## 2023-03-29 VITALS
BODY MASS INDEX: 27.89 KG/M2 | RESPIRATION RATE: 18 BRPM | SYSTOLIC BLOOD PRESSURE: 152 MMHG | HEIGHT: 68 IN | WEIGHT: 184 LBS | TEMPERATURE: 99 F | OXYGEN SATURATION: 95 % | HEART RATE: 87 BPM | DIASTOLIC BLOOD PRESSURE: 92 MMHG

## 2023-03-29 DIAGNOSIS — M10.9 ACUTE GOUT OF HAND, UNSPECIFIED CAUSE, UNSPECIFIED LATERALITY: Primary | ICD-10-CM

## 2023-03-29 DIAGNOSIS — R03.0 ELEVATED BLOOD PRESSURE READING: ICD-10-CM

## 2023-03-29 DIAGNOSIS — E11.8 TYPE 2 DIABETES MELLITUS WITH UNSPECIFIED COMPLICATIONS: ICD-10-CM

## 2023-03-29 LAB — GLUCOSE SERPL-MCNC: 320 MG/DL (ref 70–110)

## 2023-03-29 PROCEDURE — 99214 OFFICE O/P EST MOD 30 MIN: CPT | Mod: S$GLB,,, | Performed by: NURSE PRACTITIONER

## 2023-03-29 PROCEDURE — 99214 PR OFFICE/OUTPT VISIT, EST, LEVL IV, 30-39 MIN: ICD-10-PCS | Mod: S$GLB,,, | Performed by: NURSE PRACTITIONER

## 2023-03-29 PROCEDURE — 82962 POCT GLUCOSE, HAND-HELD DEVICE: ICD-10-PCS | Mod: S$GLB,,, | Performed by: NURSE PRACTITIONER

## 2023-03-29 PROCEDURE — 82962 GLUCOSE BLOOD TEST: CPT | Mod: S$GLB,,, | Performed by: NURSE PRACTITIONER

## 2023-03-29 RX ORDER — INDOMETHACIN 50 MG/1
50 CAPSULE ORAL
Qty: 15 CAPSULE | Refills: 0 | Status: SHIPPED | OUTPATIENT
Start: 2023-03-29 | End: 2023-04-03

## 2023-03-29 RX ORDER — FOLIC ACID 1 MG/1
TABLET ORAL
COMMUNITY
Start: 2023-02-05

## 2023-03-29 NOTE — PATIENT INSTRUCTIONS
Start medication as prescribed  Follow up with PCP  Go to the ER for fever, worsening pain, blood sugar over 400, difficulty waking, confusion

## 2023-03-29 NOTE — PROGRESS NOTES
"Subjective:      Patient ID: Antoine Ghosh is a 67 y.o. male.    Vitals:  height is 5' 8" (1.727 m) and weight is 83.5 kg (184 lb). His temperature is 98.6 °F (37 °C). His blood pressure is 152/92 (abnormal) and his pulse is 87. His respiration is 18 and oxygen saturation is 95%.     Chief Complaint: Pain    68 yo male with diabetes presents with complaint of polyarticular gout flare. Reports history of gout, treated multiple times at [facility]. Taking Tylenol with little relief. States that he usually is treated with a steroid shot.     Pain  This is a recurrent problem. The current episode started more than 1 year ago. The problem occurs constantly. The problem has been gradually worsening. Associated symptoms include joint swelling. Pertinent negatives include no arthralgias, chills, fatigue, fever, headaches, myalgias, nausea, numbness, rash, swollen glands, visual change, vomiting or weakness. The symptoms are aggravated by twisting. He has tried acetaminophen for the symptoms. The treatment provided no relief.     Constitution: Negative for chills, fatigue and fever.   Gastrointestinal:  Negative for nausea and vomiting.   Musculoskeletal:  Positive for joint swelling. Negative for joint pain and muscle ache.   Skin:  Negative for rash.   Neurological:  Negative for headaches and numbness.    Objective:     Physical Exam   Constitutional: He does not appear ill. No distress.   Cardiovascular: Normal rate, regular rhythm, normal heart sounds and normal pulses.   Pulmonary/Chest: Effort normal and breath sounds normal. No respiratory distress.   Abdominal: Normal appearance.   Musculoskeletal:      Comments: Erythema, swelling, and TTP of bilateral 2nd and 3rd MCP, right elbow erythema, swelling and TTP   Neurological: He is alert.   Nursing note and vitals reviewed.    Results for orders placed or performed in visit on 03/29/23   POCT Glucose, Hand-Held Device   Result Value Ref Range    POC Glucose 320 " (A) 70 - 110 MG/DL     Assessment:     1. Acute gout of hand, unspecified cause, unspecified laterality    2. Type 2 diabetes mellitus with unspecified complications    3. Elevated blood pressure reading        Plan:   Acute gout of hand, unspecified cause, unspecified laterality  -     indomethacin (INDOCIN) 50 MG capsule; Take 1 capsule (50 mg total) by mouth 3 (three) times daily with meals. for 5 days  Dispense: 15 capsule; Refill: 0    Type 2 diabetes mellitus with unspecified complications  -     POCT Glucose, Hand-Held Device    Elevated blood pressure reading  -     Physician communication order    Due to uncontrolled DM and HTN, advised patient and son that steroids could not be prescribed. No recent lab work available. Will prescribe 5 day course of Indocin. Patient has follow up with PCP in 2 weeks. Advised to discuss multiple gout flares.   ER precautions discussed, patient and son verbalize understanding.